# Patient Record
Sex: FEMALE | Race: WHITE | NOT HISPANIC OR LATINO | Employment: OTHER | URBAN - METROPOLITAN AREA
[De-identification: names, ages, dates, MRNs, and addresses within clinical notes are randomized per-mention and may not be internally consistent; named-entity substitution may affect disease eponyms.]

---

## 2023-02-07 PROBLEM — R94.4 DECREASED GFR: Status: ACTIVE | Noted: 2023-02-07

## 2023-02-07 PROBLEM — R29.898 TRANSIENT LEG WEAKNESS: Status: ACTIVE | Noted: 2023-02-07

## 2023-02-07 PROBLEM — E53.8 VITAMIN B12 DEFICIENCY: Status: ACTIVE | Noted: 2023-02-07

## 2023-02-07 PROBLEM — M62.81 MUSCLE WEAKNESS: Status: ACTIVE | Noted: 2023-02-07

## 2023-02-07 PROBLEM — G47.30 SLEEP APNEA: Status: ACTIVE | Noted: 2023-02-07

## 2023-02-07 PROBLEM — G89.29 CHRONIC EPIGASTRIC PAIN: Status: ACTIVE | Noted: 2023-02-07

## 2023-02-07 PROBLEM — R10.13 CHRONIC EPIGASTRIC PAIN: Status: ACTIVE | Noted: 2023-02-07

## 2023-02-07 PROBLEM — M19.90 ARTHRITIS: Status: ACTIVE | Noted: 2023-02-07

## 2023-02-07 PROBLEM — R10.9 ABDOMINAL PAIN: Status: ACTIVE | Noted: 2023-02-07

## 2023-02-07 PROBLEM — J45.909 ASTHMA (HHS-HCC): Status: ACTIVE | Noted: 2023-02-07

## 2023-02-07 PROBLEM — M47.27 LUMBOSACRAL RADICULOPATHY DUE TO DEGENERATIVE JOINT DISEASE OF SPINE: Status: ACTIVE | Noted: 2023-02-07

## 2023-02-07 PROBLEM — R51.9 HEADACHE, CHRONIC DAILY: Status: ACTIVE | Noted: 2023-02-07

## 2023-02-07 PROBLEM — E55.9 MILD VITAMIN D DEFICIENCY: Status: ACTIVE | Noted: 2023-02-07

## 2023-02-07 PROBLEM — L30.4 INTERTRIGO: Status: ACTIVE | Noted: 2023-02-07

## 2023-02-07 PROBLEM — M25.569 KNEE PAIN: Status: ACTIVE | Noted: 2023-02-07

## 2023-02-07 PROBLEM — D64.9 ANEMIA: Status: ACTIVE | Noted: 2023-02-07

## 2023-02-07 PROBLEM — R74.8 ELEVATED LIVER ENZYMES: Status: ACTIVE | Noted: 2023-02-07

## 2023-02-07 PROBLEM — R03.0 ELEVATED BLOOD PRESSURE READING: Status: ACTIVE | Noted: 2023-02-07

## 2023-02-07 PROBLEM — F51.02 ADJUSTMENT INSOMNIA: Status: ACTIVE | Noted: 2023-02-07

## 2023-02-07 PROBLEM — E78.5 MILD HYPERLIPIDEMIA: Status: ACTIVE | Noted: 2023-02-07

## 2023-02-07 PROBLEM — R00.1 BRADYCARDIA: Status: ACTIVE | Noted: 2023-02-07

## 2023-02-07 PROBLEM — F32.A DEPRESSION: Status: ACTIVE | Noted: 2023-02-07

## 2023-02-07 RX ORDER — HYDROCODONE BITARTRATE AND ACETAMINOPHEN 5; 325 MG/1; MG/1
TABLET ORAL
COMMUNITY
Start: 2018-10-10 | End: 2023-03-20 | Stop reason: ALTCHOICE

## 2023-02-07 RX ORDER — OMEPRAZOLE 40 MG/1
40 CAPSULE, DELAYED RELEASE ORAL DAILY
COMMUNITY
Start: 2018-10-29 | End: 2023-09-28

## 2023-02-07 RX ORDER — ASPIRIN 325 MG
50000 TABLET, DELAYED RELEASE (ENTERIC COATED) ORAL
COMMUNITY
Start: 2018-02-28 | End: 2023-03-20 | Stop reason: SDUPTHER

## 2023-02-07 RX ORDER — MONTELUKAST SODIUM 10 MG/1
10 TABLET ORAL NIGHTLY
COMMUNITY
Start: 2018-08-02

## 2023-02-07 RX ORDER — SUCRALFATE 1 G/1
1 TABLET ORAL 4 TIMES DAILY
COMMUNITY
End: 2023-03-20 | Stop reason: ALTCHOICE

## 2023-02-07 RX ORDER — NYSTATIN 100000 [USP'U]/G
POWDER TOPICAL 2 TIMES DAILY
COMMUNITY
Start: 2020-09-09 | End: 2024-03-22 | Stop reason: SDUPTHER

## 2023-02-07 RX ORDER — ALBUTEROL SULFATE 90 UG/1
2 AEROSOL, METERED RESPIRATORY (INHALATION) EVERY 6 HOURS PRN
COMMUNITY
Start: 2021-03-23

## 2023-02-07 RX ORDER — ESCITALOPRAM OXALATE 5 MG/1
5 TABLET ORAL DAILY
COMMUNITY
Start: 2022-09-02 | End: 2023-11-17 | Stop reason: SDUPTHER

## 2023-02-07 RX ORDER — TRAZODONE HYDROCHLORIDE 150 MG/1
150 TABLET ORAL NIGHTLY
COMMUNITY
Start: 2018-08-02 | End: 2024-03-22 | Stop reason: SDUPTHER

## 2023-02-07 RX ORDER — LANOLIN ALCOHOL/MO/W.PET/CERES
1 CREAM (GRAM) TOPICAL DAILY
COMMUNITY
Start: 2018-02-28 | End: 2023-03-20 | Stop reason: ALTCHOICE

## 2023-03-20 ENCOUNTER — OFFICE VISIT (OUTPATIENT)
Dept: PRIMARY CARE | Facility: CLINIC | Age: 78
End: 2023-03-20
Payer: MEDICARE

## 2023-03-20 VITALS
WEIGHT: 282 LBS | OXYGEN SATURATION: 94 % | DIASTOLIC BLOOD PRESSURE: 70 MMHG | HEIGHT: 68 IN | SYSTOLIC BLOOD PRESSURE: 104 MMHG | HEART RATE: 56 BPM | BODY MASS INDEX: 42.74 KG/M2

## 2023-03-20 DIAGNOSIS — E66.01 CLASS 3 SEVERE OBESITY DUE TO EXCESS CALORIES WITH SERIOUS COMORBIDITY AND BODY MASS INDEX (BMI) OF 40.0 TO 44.9 IN ADULT (MULTI): ICD-10-CM

## 2023-03-20 DIAGNOSIS — E53.8 VITAMIN B12 DEFICIENCY: ICD-10-CM

## 2023-03-20 DIAGNOSIS — N18.31 STAGE 3A CHRONIC KIDNEY DISEASE (MULTI): ICD-10-CM

## 2023-03-20 DIAGNOSIS — F33.9 DEPRESSION, RECURRENT (CMS-HCC): ICD-10-CM

## 2023-03-20 DIAGNOSIS — E78.5 MILD HYPERLIPIDEMIA: ICD-10-CM

## 2023-03-20 DIAGNOSIS — M15.9 PRIMARY OSTEOARTHRITIS INVOLVING MULTIPLE JOINTS: ICD-10-CM

## 2023-03-20 DIAGNOSIS — Z00.00 MEDICARE ANNUAL WELLNESS VISIT, SUBSEQUENT: Primary | ICD-10-CM

## 2023-03-20 DIAGNOSIS — R51.9 HEADACHE, CHRONIC DAILY: ICD-10-CM

## 2023-03-20 DIAGNOSIS — E11.41 DIABETIC MONONEUROPATHY ASSOCIATED WITH TYPE 2 DIABETES MELLITUS (MULTI): ICD-10-CM

## 2023-03-20 DIAGNOSIS — R73.09 ELEVATED GLUCOSE: ICD-10-CM

## 2023-03-20 DIAGNOSIS — L84 CORNS AND CALLUS: ICD-10-CM

## 2023-03-20 PROBLEM — M62.81 MUSCLE WEAKNESS: Status: RESOLVED | Noted: 2023-02-07 | Resolved: 2023-03-20

## 2023-03-20 PROBLEM — K21.9 GERD (GASTROESOPHAGEAL REFLUX DISEASE): Status: ACTIVE | Noted: 2021-05-21

## 2023-03-20 PROBLEM — Z98.84 S/P GASTRIC BYPASS: Status: ACTIVE | Noted: 2021-05-21

## 2023-03-20 PROBLEM — R10.9 ABDOMINAL PAIN: Status: RESOLVED | Noted: 2023-02-07 | Resolved: 2023-03-20

## 2023-03-20 PROBLEM — G47.33 OSA (OBSTRUCTIVE SLEEP APNEA): Status: ACTIVE | Noted: 2018-04-18

## 2023-03-20 PROBLEM — M19.90 OSTEOARTHRITIS: Status: ACTIVE | Noted: 2018-04-18

## 2023-03-20 PROBLEM — D64.9 ANEMIA: Status: RESOLVED | Noted: 2023-02-07 | Resolved: 2023-03-20

## 2023-03-20 PROBLEM — M20.40 HAMMERTOE: Status: ACTIVE | Noted: 2023-03-20

## 2023-03-20 PROBLEM — F41.9 ANXIETY: Status: ACTIVE | Noted: 2021-05-21

## 2023-03-20 PROBLEM — E66.813 CLASS 3 SEVERE OBESITY DUE TO EXCESS CALORIES WITH SERIOUS COMORBIDITY AND BODY MASS INDEX (BMI) OF 40.0 TO 44.9 IN ADULT: Status: ACTIVE | Noted: 2023-03-20

## 2023-03-20 PROCEDURE — 99214 OFFICE O/P EST MOD 30 MIN: CPT | Performed by: NURSE PRACTITIONER

## 2023-03-20 PROCEDURE — 1159F MED LIST DOCD IN RCRD: CPT | Performed by: NURSE PRACTITIONER

## 2023-03-20 PROCEDURE — 99497 ADVNCD CARE PLAN 30 MIN: CPT | Performed by: NURSE PRACTITIONER

## 2023-03-20 PROCEDURE — 1160F RVW MEDS BY RX/DR IN RCRD: CPT | Performed by: NURSE PRACTITIONER

## 2023-03-20 PROCEDURE — 3074F SYST BP LT 130 MM HG: CPT | Performed by: NURSE PRACTITIONER

## 2023-03-20 PROCEDURE — 1036F TOBACCO NON-USER: CPT | Performed by: NURSE PRACTITIONER

## 2023-03-20 PROCEDURE — 1158F ADVNC CARE PLAN TLK DOCD: CPT | Performed by: NURSE PRACTITIONER

## 2023-03-20 PROCEDURE — 96372 THER/PROPH/DIAG INJ SC/IM: CPT | Performed by: NURSE PRACTITIONER

## 2023-03-20 PROCEDURE — 3078F DIAST BP <80 MM HG: CPT | Performed by: NURSE PRACTITIONER

## 2023-03-20 PROCEDURE — G0439 PPPS, SUBSEQ VISIT: HCPCS | Performed by: NURSE PRACTITIONER

## 2023-03-20 PROCEDURE — 1170F FXNL STATUS ASSESSED: CPT | Performed by: NURSE PRACTITIONER

## 2023-03-20 RX ORDER — CYANOCOBALAMIN 1000 UG/ML
1000 INJECTION, SOLUTION INTRAMUSCULAR; SUBCUTANEOUS
COMMUNITY

## 2023-03-20 RX ORDER — DEXTROMETHORPHAN HYDROBROMIDE, GUAIFENESIN 5; 100 MG/5ML; MG/5ML
650 LIQUID ORAL EVERY 8 HOURS PRN
COMMUNITY

## 2023-03-20 RX ORDER — TOPIRAMATE 25 MG/1
50 TABLET ORAL NIGHTLY
COMMUNITY
Start: 2021-09-21 | End: 2023-06-02 | Stop reason: SDUPTHER

## 2023-03-20 RX ORDER — CYANOCOBALAMIN 1000 UG/ML
1000 INJECTION, SOLUTION INTRAMUSCULAR; SUBCUTANEOUS ONCE
Status: COMPLETED | OUTPATIENT
Start: 2023-03-20 | End: 2023-03-20

## 2023-03-20 RX ORDER — ERGOCALCIFEROL 1.25 MG/1
50000 CAPSULE ORAL
COMMUNITY
Start: 2018-02-28 | End: 2023-10-09 | Stop reason: SDUPTHER

## 2023-03-20 RX ADMIN — CYANOCOBALAMIN 1000 MCG: 1000 INJECTION, SOLUTION INTRAMUSCULAR; SUBCUTANEOUS at 11:36

## 2023-03-20 ASSESSMENT — ENCOUNTER SYMPTOMS
CONFUSION: 0
BLOOD IN STOOL: 0
SHORTNESS OF BREATH: 0
LIGHT-HEADEDNESS: 0
VOMITING: 0
DIZZINESS: 0
SLEEP DISTURBANCE: 0
NAUSEA: 0
PALPITATIONS: 0
NERVOUS/ANXIOUS: 0
LOSS OF SENSATION IN FEET: 1
HEADACHES: 0
DIARRHEA: 0
DYSURIA: 0
CONSTIPATION: 0
COLOR CHANGE: 0
ARTHRALGIAS: 1
FATIGUE: 0
DEPRESSION: 1
DYSPHORIC MOOD: 0
CHEST TIGHTNESS: 0
MYALGIAS: 0
ABDOMINAL PAIN: 0
OCCASIONAL FEELINGS OF UNSTEADINESS: 1

## 2023-03-20 ASSESSMENT — PATIENT HEALTH QUESTIONNAIRE - PHQ9
8. MOVING OR SPEAKING SO SLOWLY THAT OTHER PEOPLE COULD HAVE NOTICED. OR THE OPPOSITE, BEING SO FIGETY OR RESTLESS THAT YOU HAVE BEEN MOVING AROUND A LOT MORE THAN USUAL: NOT AT ALL
SUM OF ALL RESPONSES TO PHQ9 QUESTIONS 1 AND 2: 4
3. TROUBLE FALLING OR STAYING ASLEEP OR SLEEPING TOO MUCH: NOT AT ALL
5. POOR APPETITE OR OVEREATING: NOT AT ALL
7. TROUBLE CONCENTRATING ON THINGS, SUCH AS READING THE NEWSPAPER OR WATCHING TELEVISION: NOT AT ALL
2. FEELING DOWN, DEPRESSED OR HOPELESS: MORE THAN HALF THE DAYS
SUM OF ALL RESPONSES TO PHQ QUESTIONS 1-9: 10
4. FEELING TIRED OR HAVING LITTLE ENERGY: NEARLY EVERY DAY
10. IF YOU CHECKED OFF ANY PROBLEMS, HOW DIFFICULT HAVE THESE PROBLEMS MADE IT FOR YOU TO DO YOUR WORK, TAKE CARE OF THINGS AT HOME, OR GET ALONG WITH OTHER PEOPLE: NOT DIFFICULT AT ALL
1. LITTLE INTEREST OR PLEASURE IN DOING THINGS: MORE THAN HALF THE DAYS
6. FEELING BAD ABOUT YOURSELF - OR THAT YOU ARE A FAILURE OR HAVE LET YOURSELF OR YOUR FAMILY DOWN: NEARLY EVERY DAY
9. THOUGHTS THAT YOU WOULD BE BETTER OFF DEAD, OR OF HURTING YOURSELF: NOT AT ALL

## 2023-03-20 ASSESSMENT — ACTIVITIES OF DAILY LIVING (ADL)
MANAGING_FINANCES: INDEPENDENT
BATHING: INDEPENDENT
DRESSING: INDEPENDENT
TAKING_MEDICATION: INDEPENDENT
GROCERY_SHOPPING: INDEPENDENT
DOING_HOUSEWORK: INDEPENDENT

## 2023-03-20 NOTE — PATIENT INSTRUCTIONS
BMI was above normal measurement. Current weight: 128 kg (282 lb)  Weight change since last visit (-) denotes wt loss -2.13 lbs   Weight loss needed to achieve BMI 25: 117.9 Lbs  Weight loss needed to achieve BMI 30: 85.1 Lbs  Provided instructions on dietary changes  Advised to Increase physical activity

## 2023-03-20 NOTE — ACP (ADVANCE CARE PLANNING)
Advance Care Planning Note     Discussion Date: 03/20/23   Discussion Participants: patient    The patient wishes to discuss Advance Care Planning today and the following is a brief summary of our discussion.     Patient has capacity to make their own medical decisions: Yes  Health Care Agent/Surrogate Decision Maker documented in chart: Yes    Documents on file and valid:  Advance Directive/Living Will:  has but not on file    Health Care Power of :  has but not on file  Other:       Communication of Medical Status/Prognosis:   good     Communication of Treatment Goals/Options:   Reviewed with patient.        Treatment Decisions  She does not want to be left on a ventilator.     Time Statement: Total face to face time spent on advance care planning was 5 minutes with 5 minutes spent in counseling, including the explanation.    NAYANA Lopez-CNP  3/20/2023 11:18 AM

## 2023-03-20 NOTE — PROGRESS NOTES
"Subjective   Patient ID: Gracia Mercer is a 77 y.o. female who presents for [facility]ness Exam; routine 3 month check up; monthly B12 injection; discuss Ozempic.    HPI        Review of Systems      Objective    Vitals:    03/20/23 1043   BP: 104/70   BP Location: Right arm   Pulse: 56   SpO2: 94%   Weight: 128 kg (282 lb)   Height: 1.727 m (5' 8\")        Physical Exam       Assessment/Plan          "

## 2023-03-20 NOTE — PROGRESS NOTES
"Subjective   Reason for Visit: Gracia Mercer is an 77 y.o. female here for a Medicare Wellness visit.     Past Medical, Surgical, and Family History reviewed and updated in chart.    Reviewed all medications by prescribing practitioner or clinical pharmacist (such as prescriptions, OTCs, herbal therapies and supplements) and documented in the medical record.    HPI  Gracia returns for her medicare wellness and chronic care follow up. She is asking regarding trying Ozempic. However, she has had a recent diagnosis of pancreatitis within the past couple years so this makes her ineligible to receive it.     Evaluation for diabetic shoes: she has chronic calluses/corns/ and hammertoe on her left foot. She also has some mild neuropathy in both her feet per podiatry.     She is also due for her B12 injection which she will get today.     Patient Care Team:  NAYANA Lopez-CNP as PCP - General (Family Medicine)  David Adam DPM as Surgeon (Podiatry)  Rigoberto Loco MD as Consulting Physician (Orthopaedic Surgery)     Review of Systems   Constitutional:  Negative for fatigue.   Respiratory:  Negative for chest tightness and shortness of breath.    Cardiovascular:  Negative for chest pain, palpitations and leg swelling.   Gastrointestinal:  Negative for abdominal pain, blood in stool, constipation, diarrhea, nausea and vomiting.   Genitourinary:  Negative for dysuria.   Musculoskeletal:  Positive for arthralgias. Negative for myalgias.   Skin:  Negative for color change.   Neurological:  Negative for dizziness, light-headedness and headaches.   Psychiatric/Behavioral:  Negative for confusion, dysphoric mood, sleep disturbance and suicidal ideas. The patient is not nervous/anxious.        Objective   Vitals:  /70 (BP Location: Right arm)   Pulse 56   Ht 1.727 m (5' 8\")   Wt 128 kg (282 lb)   SpO2 94%   BMI 42.88 kg/m²       Physical Exam  Vitals and nursing note reviewed.   Constitutional:       " Appearance: Normal appearance.   HENT:      Head: Normocephalic and atraumatic.   Cardiovascular:      Rate and Rhythm: Normal rate and regular rhythm.      Heart sounds: Normal heart sounds.   Pulmonary:      Effort: Pulmonary effort is normal.      Breath sounds: Normal breath sounds.   Abdominal:      General: Bowel sounds are normal.   Skin:     General: Skin is warm and dry.   Neurological:      Mental Status: She is alert and oriented to person, place, and time.   Psychiatric:         Mood and Affect: Mood normal.         Behavior: Behavior normal.         Thought Content: Thought content normal.         Judgment: Judgment normal.         Assessment/Plan   Problem List Items Addressed This Visit          Nervous    Headache, chronic daily    Current Assessment & Plan     Topamax 50 mg nightly         Relevant Medications    topiramate (Topamax) 25 mg tablet    Diabetic mononeuropathy associated with type 2 diabetes mellitus (CMS/HCC)    Current Assessment & Plan     Sees podiatry-DR. iniguez            Genitourinary    Stage 3a chronic kidney disease    Relevant Orders    Comprehensive Metabolic Panel       Musculoskeletal    Osteoarthritis    Relevant Medications    acetaminophen (Tylenol 8 Hour) 650 mg ER tablet       Endocrine/Metabolic    Vitamin B12 deficiency    Current Assessment & Plan     B12 1000 mcg injections monthly         Relevant Medications    cyanocobalamin (Vitamin B-12) 1,000 mcg/mL injection    cyanocobalamin (Vitamin B-12) injection 1,000 mcg (Completed)    Class 3 severe obesity due to excess calories with serious comorbidity and body mass index (BMI) of 40.0 to 44.9 in adult (CMS/HCC)       Other    Depression, recurrent (CMS/HCC)    Relevant Medications    ergocalciferol (Vitamin D-2) 1.25 MG (68013 UT) capsule    Mild hyperlipidemia    Relevant Orders    Lipid Panel    Comprehensive Metabolic Panel    Corns and callus    Current Assessment & Plan     Sees David Berg for  podiatry          Other Visit Diagnoses       Medicare annual wellness visit, subsequent    -  Primary    Elevated glucose        Relevant Orders    Hemoglobin A1C             Follow up in 6 months. She is to get lab work, in 2 months. We will call her with results.

## 2023-04-25 ENCOUNTER — CLINICAL SUPPORT (OUTPATIENT)
Dept: PRIMARY CARE | Facility: CLINIC | Age: 78
End: 2023-04-25
Payer: MEDICARE

## 2023-04-25 ENCOUNTER — TELEPHONE (OUTPATIENT)
Dept: PRIMARY CARE | Facility: CLINIC | Age: 78
End: 2023-04-25

## 2023-04-25 DIAGNOSIS — E53.8 VITAMIN B12 DEFICIENCY: ICD-10-CM

## 2023-04-25 PROCEDURE — 96372 THER/PROPH/DIAG INJ SC/IM: CPT | Performed by: NURSE PRACTITIONER

## 2023-04-25 RX ORDER — CYANOCOBALAMIN 1000 UG/ML
1000 INJECTION, SOLUTION INTRAMUSCULAR; SUBCUTANEOUS ONCE
Status: COMPLETED | OUTPATIENT
Start: 2023-04-25 | End: 2023-04-25

## 2023-04-25 RX ADMIN — CYANOCOBALAMIN 1000 MCG: 1000 INJECTION, SOLUTION INTRAMUSCULAR; SUBCUTANEOUS at 13:17

## 2023-04-25 NOTE — TELEPHONE ENCOUNTER
She was in today for a B12 and she said you had talked to her about about Rybelsus. She said she would like to try it.

## 2023-04-27 ENCOUNTER — LAB (OUTPATIENT)
Dept: LAB | Facility: LAB | Age: 78
End: 2023-04-27
Payer: MEDICARE

## 2023-04-27 DIAGNOSIS — N18.31 STAGE 3A CHRONIC KIDNEY DISEASE (MULTI): ICD-10-CM

## 2023-04-27 DIAGNOSIS — E78.5 MILD HYPERLIPIDEMIA: ICD-10-CM

## 2023-04-27 DIAGNOSIS — R73.09 ELEVATED GLUCOSE: ICD-10-CM

## 2023-04-27 LAB
ALANINE AMINOTRANSFERASE (SGPT) (U/L) IN SER/PLAS: 9 U/L (ref 7–45)
ALBUMIN (G/DL) IN SER/PLAS: 3.6 G/DL (ref 3.4–5)
ALKALINE PHOSPHATASE (U/L) IN SER/PLAS: 58 U/L (ref 33–136)
ANION GAP IN SER/PLAS: 13 MMOL/L (ref 10–20)
ASPARTATE AMINOTRANSFERASE (SGOT) (U/L) IN SER/PLAS: 15 U/L (ref 9–39)
BILIRUBIN TOTAL (MG/DL) IN SER/PLAS: 0.8 MG/DL (ref 0–1.2)
CALCIUM (MG/DL) IN SER/PLAS: 8.8 MG/DL (ref 8.6–10.3)
CARBON DIOXIDE, TOTAL (MMOL/L) IN SER/PLAS: 29 MMOL/L (ref 21–32)
CHLORIDE (MMOL/L) IN SER/PLAS: 103 MMOL/L (ref 98–107)
CHOLESTEROL (MG/DL) IN SER/PLAS: 143 MG/DL (ref 0–199)
CHOLESTEROL IN HDL (MG/DL) IN SER/PLAS: 55 MG/DL
CHOLESTEROL/HDL RATIO: 2.6
CREATININE (MG/DL) IN SER/PLAS: 1.07 MG/DL (ref 0.5–1.05)
ESTIMATED AVERAGE GLUCOSE FOR HBA1C: 108 MG/DL
GFR FEMALE: 54 ML/MIN/1.73M2
GLUCOSE (MG/DL) IN SER/PLAS: 77 MG/DL (ref 74–99)
HEMOGLOBIN A1C/HEMOGLOBIN TOTAL IN BLOOD: 5.4 %
LDL: 78 MG/DL (ref 0–99)
POTASSIUM (MMOL/L) IN SER/PLAS: 4.5 MMOL/L (ref 3.5–5.3)
PROTEIN TOTAL: 6.4 G/DL (ref 6.4–8.2)
SODIUM (MMOL/L) IN SER/PLAS: 140 MMOL/L (ref 136–145)
TRIGLYCERIDE (MG/DL) IN SER/PLAS: 50 MG/DL (ref 0–149)
UREA NITROGEN (MG/DL) IN SER/PLAS: 19 MG/DL (ref 6–23)
VLDL: 10 MG/DL (ref 0–40)

## 2023-04-27 PROCEDURE — 83036 HEMOGLOBIN GLYCOSYLATED A1C: CPT

## 2023-04-27 PROCEDURE — 36415 COLL VENOUS BLD VENIPUNCTURE: CPT

## 2023-04-27 PROCEDURE — 80061 LIPID PANEL: CPT

## 2023-04-27 PROCEDURE — 80053 COMPREHEN METABOLIC PANEL: CPT

## 2023-04-28 ENCOUNTER — TELEPHONE (OUTPATIENT)
Dept: PRIMARY CARE | Facility: CLINIC | Age: 78
End: 2023-04-28
Payer: COMMERCIAL

## 2023-04-28 NOTE — TELEPHONE ENCOUNTER
Pt notified of the following results.  She is inquiring if there is anything she can do to improve kidney function.

## 2023-04-28 NOTE — TELEPHONE ENCOUNTER
----- Message from Zeinab Chopra, APRN-CNP sent at 4/28/2023 12:18 PM EDT -----  Let her know her lab work showed her A1c is 5.4 which is in the normal range. So she is no longer diabetic or prediabetic. Her cholesterol panel is about the same as it was last year. All within normal range. Her electrolyte panel/liver panel was fine. Her kidney function is a little worse. We will continue to monitor this every 6 months.

## 2023-06-02 ENCOUNTER — OFFICE VISIT (OUTPATIENT)
Dept: PRIMARY CARE | Facility: CLINIC | Age: 78
End: 2023-06-02
Payer: MEDICARE

## 2023-06-02 VITALS
WEIGHT: 286 LBS | BODY MASS INDEX: 44.89 KG/M2 | HEART RATE: 63 BPM | DIASTOLIC BLOOD PRESSURE: 66 MMHG | SYSTOLIC BLOOD PRESSURE: 116 MMHG | HEIGHT: 67 IN | OXYGEN SATURATION: 94 %

## 2023-06-02 DIAGNOSIS — E11.41 DIABETIC MONONEUROPATHY ASSOCIATED WITH TYPE 2 DIABETES MELLITUS (MULTI): Primary | Chronic | ICD-10-CM

## 2023-06-02 DIAGNOSIS — R51.9 HEADACHE, CHRONIC DAILY: ICD-10-CM

## 2023-06-02 DIAGNOSIS — E53.8 VITAMIN B12 DEFICIENCY: ICD-10-CM

## 2023-06-02 DIAGNOSIS — E66.01 CLASS 3 SEVERE OBESITY DUE TO EXCESS CALORIES WITH SERIOUS COMORBIDITY AND BODY MASS INDEX (BMI) OF 40.0 TO 44.9 IN ADULT (MULTI): ICD-10-CM

## 2023-06-02 PROBLEM — M25.569 KNEE PAIN: Status: RESOLVED | Noted: 2023-02-07 | Resolved: 2023-06-02

## 2023-06-02 PROCEDURE — 1036F TOBACCO NON-USER: CPT | Performed by: NURSE PRACTITIONER

## 2023-06-02 PROCEDURE — 1160F RVW MEDS BY RX/DR IN RCRD: CPT | Performed by: NURSE PRACTITIONER

## 2023-06-02 PROCEDURE — 99214 OFFICE O/P EST MOD 30 MIN: CPT | Performed by: NURSE PRACTITIONER

## 2023-06-02 PROCEDURE — 1159F MED LIST DOCD IN RCRD: CPT | Performed by: NURSE PRACTITIONER

## 2023-06-02 PROCEDURE — 96372 THER/PROPH/DIAG INJ SC/IM: CPT | Performed by: NURSE PRACTITIONER

## 2023-06-02 RX ORDER — CYANOCOBALAMIN 1000 UG/ML
1000 INJECTION, SOLUTION INTRAMUSCULAR; SUBCUTANEOUS ONCE
Status: COMPLETED | OUTPATIENT
Start: 2023-06-02 | End: 2023-06-02

## 2023-06-02 RX ORDER — TOPIRAMATE 25 MG/1
50 TABLET ORAL NIGHTLY
Qty: 180 TABLET | Refills: 1 | Status: SHIPPED | OUTPATIENT
Start: 2023-06-02 | End: 2024-04-22

## 2023-06-02 RX ADMIN — CYANOCOBALAMIN 1000 MCG: 1000 INJECTION, SOLUTION INTRAMUSCULAR; SUBCUTANEOUS at 11:45

## 2023-06-02 ASSESSMENT — PATIENT HEALTH QUESTIONNAIRE - PHQ9
SUM OF ALL RESPONSES TO PHQ9 QUESTIONS 1 AND 2: 0
1. LITTLE INTEREST OR PLEASURE IN DOING THINGS: NOT AT ALL
2. FEELING DOWN, DEPRESSED OR HOPELESS: NOT AT ALL

## 2023-06-02 NOTE — PROGRESS NOTES
Subjective   Patient ID: Gracia Mercer is a 78 y.o. female who presents to discuss weight loss, B12, exertional SOB, and arthritis      HPI  Gracia returns with concerns regarding to questions to medications, arthritic pain, numbness/discoloration to bilateral hands, and weight gain.     Use of Topamax: she is currently being Rx Topamax for Chronic headaches, however she reports she doesn't take this medicine as prescribed. She reports she is still getting headaches several times a week.     Weight gain: She reports steady weight gain since having her bariatric surgery in 1996-7.  She is asking if she can start injectables for weight loss. She has not been active. She reports she gets up in the morning, eats breakfast, and usually goes back to her room after. Just started walking once or twice with her daughter since her daughter started getting active and is taking weight loss medication and has lost 45# over the last couple months. Gracia's A1c has been in the normal range since having bariatric surgery as she was classified as diabetic prior to surgery but has not taken medication since her surgery.     Type 2 DM: Controlled by diet. A1c was 5.4% .    Vitamin B12 def: discussed why she needs to be on b12 replacement.    Arthritis pain: complaints of joint pain with movement. Has not been active.     Increase in shortness of breath/fatigue: discussed that sine she has not been active she has deconditioned and she needs to be more active to build her tolerance back up. Discussed that to manage arthritic pain it is important to keep moving.     Numbness/discoloration to hands: has been diagnosed with Raynauds in the past. Denies constant numbness. Reports it is intermittent. Discussed ways to manage symptoms and to protect hands especially in winter/cold months.     Review of Systems   Constitutional:  Positive for unexpected weight change (weight gain). Negative for fatigue.   Respiratory:  Positive for  "shortness of breath. Negative for chest tightness.    Cardiovascular:  Negative for chest pain, palpitations and leg swelling.   Gastrointestinal:  Negative for abdominal pain, blood in stool, constipation, diarrhea, nausea and vomiting.   Genitourinary:  Negative for dysuria.   Musculoskeletal:  Positive for arthralgias. Negative for myalgias.   Skin:  Positive for color change (hands bilaterally intemittently).   Neurological:  Negative for dizziness, light-headedness and headaches.       Objective   Vitals:    06/02/23 1028   BP: 116/66   BP Location: Left arm   Pulse: 63   SpO2: 94%   Weight: 130 kg (286 lb)   Height: 1.702 m (5' 7\")      Physical Exam  Vitals and nursing note reviewed.   Constitutional:       Appearance: Normal appearance.   Cardiovascular:      Rate and Rhythm: Normal rate and regular rhythm.      Heart sounds: Normal heart sounds.   Pulmonary:      Effort: Pulmonary effort is normal.      Breath sounds: Normal breath sounds.   Neurological:      Mental Status: She is alert and oriented to person, place, and time.   Psychiatric:         Mood and Affect: Mood normal.         Behavior: Behavior normal.         Thought Content: Thought content normal.         Judgment: Judgment normal.         Assessment/Plan   Diagnoses and all orders for this visit:  Diabetic mononeuropathy associated with type 2 diabetes mellitus (CMS/[facility])  -     semaglutide 0.25 mg or 0.5 mg (2 mg/3 mL) pen injector; Inject 0.25 mg under the skin 1 (one) time per week.  Headache, chronic daily  -     topiramate (Topamax) 25 mg tablet; Take 2 tablets (50 mg) by mouth once daily at bedtime.  Vitamin B12 deficiency  -     cyanocobalamin (Vitamin B-12) injection 1,000 mcg  Class 3 severe obesity due to excess calories with serious comorbidity and body mass index (BMI) of 40.0 to 44.9 in adult (CMS/[facility])  -     semaglutide 0.25 mg or 0.5 mg (2 mg/3 mL) pen injector; Inject 0.25 mg under the skin 1 (one) time per week.      Problem " List Items Addressed This Visit       Vitamin B12 deficiency    Headache, chronic daily    Relevant Medications    topiramate (Topamax) 25 mg tablet    Class 3 severe obesity due to excess calories with serious comorbidity and body mass index (BMI) of 40.0 to 44.9 in adult (CMS/[facility])    Relevant Medications    semaglutide 0.25 mg or 0.5 mg (2 mg/3 mL) pen injector    Diabetic mononeuropathy associated with type 2 diabetes mellitus (CMS/[facility]) - Primary     Start Ozempic 0.25 mg x 4 weeks, and then increase to 0.5 mg x 4 weeks. Monitor Blood sugars.          Relevant Medications    semaglutide 0.25 mg or 0.5 mg (2 mg/3 mL) pen injector     Follow up in 3 months, September.

## 2023-06-04 ASSESSMENT — ENCOUNTER SYMPTOMS
ABDOMINAL PAIN: 0
DYSURIA: 0
NAUSEA: 0
FATIGUE: 0
HEADACHES: 0
SHORTNESS OF BREATH: 1
CONSTIPATION: 0
MYALGIAS: 0
VOMITING: 0
LIGHT-HEADEDNESS: 0
PALPITATIONS: 0
ARTHRALGIAS: 1
DIARRHEA: 0
COLOR CHANGE: 1
CHEST TIGHTNESS: 0
BLOOD IN STOOL: 0
DIZZINESS: 0
UNEXPECTED WEIGHT CHANGE: 1

## 2023-06-22 ENCOUNTER — CLINICAL SUPPORT (OUTPATIENT)
Dept: PRIMARY CARE | Facility: CLINIC | Age: 78
End: 2023-06-22
Payer: MEDICARE

## 2023-06-22 DIAGNOSIS — E53.8 VITAMIN B12 DEFICIENCY: ICD-10-CM

## 2023-06-22 PROCEDURE — 96372 THER/PROPH/DIAG INJ SC/IM: CPT | Performed by: NURSE PRACTITIONER

## 2023-06-22 RX ORDER — CYANOCOBALAMIN 1000 UG/ML
1000 INJECTION, SOLUTION INTRAMUSCULAR; SUBCUTANEOUS ONCE
Status: COMPLETED | OUTPATIENT
Start: 2023-06-22 | End: 2023-06-22

## 2023-06-22 RX ADMIN — CYANOCOBALAMIN 1000 MCG: 1000 INJECTION, SOLUTION INTRAMUSCULAR; SUBCUTANEOUS at 11:23

## 2023-06-23 ENCOUNTER — APPOINTMENT (OUTPATIENT)
Dept: PRIMARY CARE | Facility: CLINIC | Age: 78
End: 2023-06-23
Payer: MEDICARE

## 2023-07-13 DIAGNOSIS — E11.41 DIABETIC MONONEUROPATHY ASSOCIATED WITH TYPE 2 DIABETES MELLITUS (MULTI): Chronic | ICD-10-CM

## 2023-07-13 DIAGNOSIS — E66.01 CLASS 3 SEVERE OBESITY DUE TO EXCESS CALORIES WITH SERIOUS COMORBIDITY AND BODY MASS INDEX (BMI) OF 40.0 TO 44.9 IN ADULT (MULTI): ICD-10-CM

## 2023-07-13 RX ORDER — INSULIN PUMP SYRINGE, 3 ML
1 EACH MISCELLANEOUS DAILY
COMMUNITY
End: 2023-07-13 | Stop reason: SDUPTHER

## 2023-07-13 RX ORDER — LANCETS
1 EACH MISCELLANEOUS DAILY
COMMUNITY
End: 2023-07-13 | Stop reason: SDUPTHER

## 2023-07-13 RX ORDER — LANCETS
1 EACH MISCELLANEOUS DAILY
Qty: 100 EACH | Refills: 0 | Status: SHIPPED | OUTPATIENT
Start: 2023-07-13

## 2023-07-13 RX ORDER — INSULIN PUMP SYRINGE, 3 ML
1 EACH MISCELLANEOUS DAILY
Qty: 1 EACH | Refills: 0 | Status: SHIPPED | OUTPATIENT
Start: 2023-07-13

## 2023-07-13 RX ORDER — IBUPROFEN 200 MG
1 CAPSULE ORAL DAILY
Qty: 100 STRIP | Refills: 0 | Status: SHIPPED | OUTPATIENT
Start: 2023-07-13

## 2023-07-13 RX ORDER — IBUPROFEN 200 MG
1 CAPSULE ORAL DAILY
COMMUNITY
End: 2023-07-13 | Stop reason: SDUPTHER

## 2023-07-20 ENCOUNTER — CLINICAL SUPPORT (OUTPATIENT)
Dept: PRIMARY CARE | Facility: CLINIC | Age: 78
End: 2023-07-20
Payer: MEDICARE

## 2023-07-20 DIAGNOSIS — E53.8 VITAMIN B12 DEFICIENCY: ICD-10-CM

## 2023-07-20 PROCEDURE — 96372 THER/PROPH/DIAG INJ SC/IM: CPT | Performed by: NURSE PRACTITIONER

## 2023-07-20 RX ORDER — CYANOCOBALAMIN 1000 UG/ML
1000 INJECTION, SOLUTION INTRAMUSCULAR; SUBCUTANEOUS ONCE
Status: COMPLETED | OUTPATIENT
Start: 2023-07-20 | End: 2023-07-20

## 2023-07-20 RX ADMIN — CYANOCOBALAMIN 1000 MCG: 1000 INJECTION, SOLUTION INTRAMUSCULAR; SUBCUTANEOUS at 10:32

## 2023-08-07 ENCOUNTER — OFFICE VISIT (OUTPATIENT)
Dept: PRIMARY CARE | Facility: CLINIC | Age: 78
End: 2023-08-07
Payer: MEDICARE

## 2023-08-07 VITALS
WEIGHT: 276 LBS | BODY MASS INDEX: 43.32 KG/M2 | HEIGHT: 67 IN | HEART RATE: 58 BPM | DIASTOLIC BLOOD PRESSURE: 70 MMHG | SYSTOLIC BLOOD PRESSURE: 128 MMHG

## 2023-08-07 DIAGNOSIS — E53.8 VITAMIN B12 DEFICIENCY: ICD-10-CM

## 2023-08-07 DIAGNOSIS — E66.01 CLASS 3 SEVERE OBESITY DUE TO EXCESS CALORIES WITH SERIOUS COMORBIDITY AND BODY MASS INDEX (BMI) OF 40.0 TO 44.9 IN ADULT (MULTI): ICD-10-CM

## 2023-08-07 DIAGNOSIS — E11.41 DIABETIC MONONEUROPATHY ASSOCIATED WITH TYPE 2 DIABETES MELLITUS (MULTI): Primary | ICD-10-CM

## 2023-08-07 DIAGNOSIS — N18.31 STAGE 3A CHRONIC KIDNEY DISEASE (MULTI): ICD-10-CM

## 2023-08-07 DIAGNOSIS — E55.9 MILD VITAMIN D DEFICIENCY: ICD-10-CM

## 2023-08-07 DIAGNOSIS — E78.5 MILD HYPERLIPIDEMIA: ICD-10-CM

## 2023-08-07 PROCEDURE — 1159F MED LIST DOCD IN RCRD: CPT | Performed by: NURSE PRACTITIONER

## 2023-08-07 PROCEDURE — 99213 OFFICE O/P EST LOW 20 MIN: CPT | Performed by: NURSE PRACTITIONER

## 2023-08-07 PROCEDURE — 1036F TOBACCO NON-USER: CPT | Performed by: NURSE PRACTITIONER

## 2023-08-07 PROCEDURE — 1160F RVW MEDS BY RX/DR IN RCRD: CPT | Performed by: NURSE PRACTITIONER

## 2023-08-07 RX ORDER — LANCETS 33 GAUGE
EACH MISCELLANEOUS
COMMUNITY
Start: 2023-07-13

## 2023-08-07 ASSESSMENT — ENCOUNTER SYMPTOMS
LIGHT-HEADEDNESS: 0
ARTHRALGIAS: 0
HEADACHES: 0
CHEST TIGHTNESS: 0
NAUSEA: 0
FATIGUE: 0
DIZZINESS: 0
DIARRHEA: 0
COLOR CHANGE: 0
BLOOD IN STOOL: 0
VOMITING: 0
MYALGIAS: 0
DYSURIA: 0
PALPITATIONS: 0
ABDOMINAL PAIN: 0
CONSTIPATION: 0
SHORTNESS OF BREATH: 0

## 2023-08-07 NOTE — PROGRESS NOTES
"Subjective   Patient ID: Gracia Mercer is a 78 y.o. female who presents for Ozempic follow up.    HPI   Gracia returns for Ozempic follow up.    Doing well, has not noticed any low blood sugars. Does have some intermittent nausea but no vomiting or constipation/diarrhea issues. She reports she has no concerns. She just started her 0.5 mg weekly injections this past week (Thursday?). She is down 10# since starting last month.         Review of Systems   Constitutional:  Negative for fatigue.   Respiratory:  Negative for chest tightness and shortness of breath.    Cardiovascular:  Negative for chest pain, palpitations and leg swelling.   Gastrointestinal:  Negative for abdominal pain, blood in stool, constipation, diarrhea, nausea and vomiting.   Genitourinary:  Negative for dysuria.   Musculoskeletal:  Negative for arthralgias and myalgias.   Skin:  Negative for color change.   Neurological:  Negative for dizziness, light-headedness and headaches.       Objective   /70   Pulse 58   Ht 1.702 m (5' 7\")   Wt 125 kg (276 lb)   BMI 43.23 kg/m²     Physical Exam  Vitals and nursing note reviewed.   Constitutional:       Appearance: Normal appearance.   HENT:      Head: Normocephalic and atraumatic.   Cardiovascular:      Rate and Rhythm: Normal rate and regular rhythm.      Pulses: Normal pulses.      Heart sounds: Normal heart sounds.   Pulmonary:      Effort: Pulmonary effort is normal.      Breath sounds: Normal breath sounds.   Abdominal:      General: Bowel sounds are normal.      Palpations: Abdomen is soft.   Skin:     General: Skin is warm and dry.   Neurological:      Mental Status: She is alert and oriented to person, place, and time.   Psychiatric:         Mood and Affect: Mood normal.         Behavior: Behavior normal.         Thought Content: Thought content normal.         Judgment: Judgment normal.         Assessment/Plan   Problem List Items Addressed This Visit       Stage 3a chronic kidney " disease (CMS/[facility])    Vitamin B12 deficiency    Relevant Orders    Vitamin B12    Mild vitamin D deficiency    Relevant Orders    Vitamin D, Total    Mild hyperlipidemia    Relevant Orders    Lipid Panel    Class 3 severe obesity due to excess calories with serious comorbidity and body mass index (BMI) of 40.0 to 44.9 in adult (CMS/[facility])     Pt advised to institute a healthy, well-balanced meal with portion control and to exercise daily for at least 30-60 minutes.         Diabetic mononeuropathy associated with type 2 diabetes mellitus (CMS/[facility]) - Primary     Continue ozempic at 0.5 mg weekly and then increase to 1 mg weekly for 4 weeks.   Due for labs in October.          Relevant Orders    Comprehensive Metabolic Panel    Hemoglobin A1C    TSH with reflex to Free T4 if abnormal    CBC and Auto Differential         Follow up in 2-3 months.

## 2023-08-08 NOTE — ASSESSMENT & PLAN NOTE
Continue ozempic at 0.5 mg weekly and then increase to 1 mg weekly for 4 weeks.   Due for labs in October.

## 2023-08-24 ENCOUNTER — CLINICAL SUPPORT (OUTPATIENT)
Dept: PRIMARY CARE | Facility: CLINIC | Age: 78
End: 2023-08-24
Payer: MEDICARE

## 2023-08-24 DIAGNOSIS — E11.41 DIABETIC MONONEUROPATHY ASSOCIATED WITH TYPE 2 DIABETES MELLITUS (MULTI): Chronic | ICD-10-CM

## 2023-08-24 DIAGNOSIS — R03.0 ELEVATED BLOOD PRESSURE READING: Primary | ICD-10-CM

## 2023-08-24 DIAGNOSIS — E66.01 CLASS 3 SEVERE OBESITY DUE TO EXCESS CALORIES WITH SERIOUS COMORBIDITY AND BODY MASS INDEX (BMI) OF 40.0 TO 44.9 IN ADULT (MULTI): ICD-10-CM

## 2023-08-24 DIAGNOSIS — E53.8 VITAMIN B12 DEFICIENCY: Primary | ICD-10-CM

## 2023-08-24 PROCEDURE — 96372 THER/PROPH/DIAG INJ SC/IM: CPT | Performed by: NURSE PRACTITIONER

## 2023-08-24 RX ORDER — CYANOCOBALAMIN 1000 UG/ML
1000 INJECTION, SOLUTION INTRAMUSCULAR; SUBCUTANEOUS ONCE
Status: COMPLETED | OUTPATIENT
Start: 2023-08-24 | End: 2023-08-24

## 2023-08-24 RX ORDER — ACETAMINOPHEN 500 MG
1 TABLET ORAL DAILY
Qty: 1 KIT | Refills: 0 | Status: SHIPPED | OUTPATIENT
Start: 2023-08-24

## 2023-08-24 RX ADMIN — CYANOCOBALAMIN 1000 MCG: 1000 INJECTION, SOLUTION INTRAMUSCULAR; SUBCUTANEOUS at 10:57

## 2023-08-24 NOTE — PROGRESS NOTES
Patient here today for her Vitamin B 12 injection   given per nurse protocol    Patient tolerated well   given in Left Deltoid

## 2023-09-08 ENCOUNTER — APPOINTMENT (OUTPATIENT)
Dept: PRIMARY CARE | Facility: CLINIC | Age: 78
End: 2023-09-08
Payer: MEDICARE

## 2023-09-24 DIAGNOSIS — E11.41 DIABETIC MONONEUROPATHY ASSOCIATED WITH TYPE 2 DIABETES MELLITUS (MULTI): Chronic | ICD-10-CM

## 2023-09-24 DIAGNOSIS — E66.01 CLASS 3 SEVERE OBESITY DUE TO EXCESS CALORIES WITH SERIOUS COMORBIDITY AND BODY MASS INDEX (BMI) OF 40.0 TO 44.9 IN ADULT (MULTI): ICD-10-CM

## 2023-09-24 DIAGNOSIS — R10.9 UNSPECIFIED ABDOMINAL PAIN: ICD-10-CM

## 2023-09-25 ENCOUNTER — CLINICAL SUPPORT (OUTPATIENT)
Dept: PRIMARY CARE | Facility: CLINIC | Age: 78
End: 2023-09-25
Payer: MEDICARE

## 2023-09-25 DIAGNOSIS — E53.8 VITAMIN B12 DEFICIENCY: ICD-10-CM

## 2023-09-25 PROCEDURE — 96372 THER/PROPH/DIAG INJ SC/IM: CPT | Performed by: NURSE PRACTITIONER

## 2023-09-25 RX ORDER — CYANOCOBALAMIN 1000 UG/ML
1000 INJECTION, SOLUTION INTRAMUSCULAR; SUBCUTANEOUS ONCE
Status: COMPLETED | OUTPATIENT
Start: 2023-09-25 | End: 2023-09-25

## 2023-09-25 RX ADMIN — CYANOCOBALAMIN 1000 MCG: 1000 INJECTION, SOLUTION INTRAMUSCULAR; SUBCUTANEOUS at 10:21

## 2023-09-25 NOTE — PROGRESS NOTES
Here for B12 injection   Problem: Risk for Impaired Skin Integrity  Goal: Tissue integrity - skin and mucous membranes  Structural intactness and normal physiological function of skin and  mucous membranes. Outcome: Ongoing  Patient remains at risk for impaired skin and tissue integrity due to decreased mobility. Frequent skin assessments have been performed throughout this shift. Patient encouraged to reposition self. No new areas of concern have been noted. Will continue to monitor. Problem: Falls - Risk of  Goal: Absence of falls  Outcome: Ongoing  Patient remains free from falls throughout this shift. Patient calls out appropriately. Call light and bedside table are within reach. Bed in lowest position and wheels are locked. Bed alarm on for added safety.

## 2023-09-28 DIAGNOSIS — E66.01 CLASS 3 SEVERE OBESITY DUE TO EXCESS CALORIES WITH SERIOUS COMORBIDITY AND BODY MASS INDEX (BMI) OF 40.0 TO 44.9 IN ADULT (MULTI): Primary | ICD-10-CM

## 2023-09-28 DIAGNOSIS — E11.41 DIABETIC MONONEUROPATHY ASSOCIATED WITH TYPE 2 DIABETES MELLITUS (MULTI): ICD-10-CM

## 2023-09-28 RX ORDER — OMEPRAZOLE 40 MG/1
40 CAPSULE, DELAYED RELEASE ORAL DAILY
Qty: 90 CAPSULE | Refills: 1 | Status: SHIPPED | OUTPATIENT
Start: 2023-09-28 | End: 2024-03-22 | Stop reason: SDUPTHER

## 2023-09-29 ENCOUNTER — TELEPHONE (OUTPATIENT)
Dept: PRIMARY CARE | Facility: CLINIC | Age: 78
End: 2023-09-29

## 2023-09-29 NOTE — TELEPHONE ENCOUNTER
Sharon called in LM that her Ozempic was $200.00 when she went to pick it up plus she states she is only taking the 1mgm.

## 2023-10-09 ENCOUNTER — OFFICE VISIT (OUTPATIENT)
Dept: PRIMARY CARE | Facility: CLINIC | Age: 78
End: 2023-10-09
Payer: MEDICARE

## 2023-10-09 VITALS
WEIGHT: 265.13 LBS | HEART RATE: 61 BPM | DIASTOLIC BLOOD PRESSURE: 80 MMHG | SYSTOLIC BLOOD PRESSURE: 124 MMHG | BODY MASS INDEX: 41.61 KG/M2 | HEIGHT: 67 IN

## 2023-10-09 DIAGNOSIS — E66.01 CLASS 3 SEVERE OBESITY DUE TO EXCESS CALORIES WITH SERIOUS COMORBIDITY AND BODY MASS INDEX (BMI) OF 40.0 TO 44.9 IN ADULT (MULTI): ICD-10-CM

## 2023-10-09 DIAGNOSIS — E55.9 MILD VITAMIN D DEFICIENCY: Primary | ICD-10-CM

## 2023-10-09 DIAGNOSIS — Z23 IMMUNIZATION DUE: ICD-10-CM

## 2023-10-09 DIAGNOSIS — E11.41 DIABETIC MONONEUROPATHY ASSOCIATED WITH TYPE 2 DIABETES MELLITUS (MULTI): ICD-10-CM

## 2023-10-09 PROBLEM — N18.31 STAGE 3A CHRONIC KIDNEY DISEASE (MULTI): Status: RESOLVED | Noted: 2023-02-07 | Resolved: 2023-10-09

## 2023-10-09 PROCEDURE — 90662 IIV NO PRSV INCREASED AG IM: CPT | Performed by: NURSE PRACTITIONER

## 2023-10-09 PROCEDURE — 1158F ADVNC CARE PLAN TLK DOCD: CPT | Performed by: NURSE PRACTITIONER

## 2023-10-09 PROCEDURE — 1160F RVW MEDS BY RX/DR IN RCRD: CPT | Performed by: NURSE PRACTITIONER

## 2023-10-09 PROCEDURE — G0008 ADMIN INFLUENZA VIRUS VAC: HCPCS | Performed by: NURSE PRACTITIONER

## 2023-10-09 PROCEDURE — 1036F TOBACCO NON-USER: CPT | Performed by: NURSE PRACTITIONER

## 2023-10-09 PROCEDURE — 1159F MED LIST DOCD IN RCRD: CPT | Performed by: NURSE PRACTITIONER

## 2023-10-09 PROCEDURE — 99213 OFFICE O/P EST LOW 20 MIN: CPT | Performed by: NURSE PRACTITIONER

## 2023-10-09 RX ORDER — ERGOCALCIFEROL 1.25 MG/1
50000 CAPSULE ORAL
Qty: 12 CAPSULE | Refills: 1 | Status: SHIPPED | OUTPATIENT
Start: 2023-10-09 | End: 2023-11-17 | Stop reason: ALTCHOICE

## 2023-10-09 ASSESSMENT — ENCOUNTER SYMPTOMS
ARTHRALGIAS: 0
FATIGUE: 0
HEADACHES: 0
SHORTNESS OF BREATH: 0
COLOR CHANGE: 0
HEMATOLOGIC/LYMPHATIC NEGATIVE: 1
ABDOMINAL PAIN: 0
DIARRHEA: 0
CONSTIPATION: 0
PSYCHIATRIC NEGATIVE: 1
DYSURIA: 0
CHEST TIGHTNESS: 0
LIGHT-HEADEDNESS: 0
NAUSEA: 0
BLOOD IN STOOL: 0
DIZZINESS: 0
ACTIVITY CHANGE: 1
PALPITATIONS: 0
VOMITING: 0
MYALGIAS: 0

## 2023-10-09 NOTE — PROGRESS NOTES
"Subjective   Patient ID: Gracia Mercer is a 78 y.o. female who presents for Follow-up.    HPI   Gracia returns for follow up after starting on Ozempic.      Is down 11# since starting medication.     Had foot surgery with Dr. Adam-has follow up scheduled.     She has not gotten her lab work-encouraged her to do this, so we can monitor her diabetes after starting her Ozempic.     Review of Systems   Constitutional:  Positive for activity change (wearing post-op shoe-recently had foot surgery). Negative for fatigue.   Respiratory:  Negative for chest tightness and shortness of breath.    Cardiovascular:  Negative for chest pain, palpitations and leg swelling.   Gastrointestinal:  Negative for abdominal pain, blood in stool, constipation, diarrhea, nausea and vomiting.   Genitourinary:  Negative for dysuria.   Musculoskeletal:  Negative for arthralgias and myalgias.        Uses cane   Skin:  Negative for color change.   Neurological:  Negative for dizziness, light-headedness and headaches.   Hematological: Negative.    Psychiatric/Behavioral: Negative.         Objective   /80   Pulse 61   Ht 1.702 m (5' 7\")   Wt 120 kg (265 lb 2 oz)   BMI 41.52 kg/m²     Physical Exam  Vitals and nursing note reviewed.   Constitutional:       Appearance: Normal appearance.   Cardiovascular:      Rate and Rhythm: Normal rate and regular rhythm.      Pulses: Normal pulses.      Heart sounds: Normal heart sounds.   Pulmonary:      Effort: Pulmonary effort is normal.      Breath sounds: Normal breath sounds.   Skin:     General: Skin is warm and dry.   Neurological:      Mental Status: She is alert and oriented to person, place, and time.   Psychiatric:         Mood and Affect: Mood normal.         Behavior: Behavior normal.         Thought Content: Thought content normal.         Judgment: Judgment normal.         Assessment/Plan   Problem List Items Addressed This Visit             ICD-10-CM    Mild vitamin D " deficiency - Primary E55.9     Vitamin D 50,000 units weekly - refilled           Relevant Medications    ergocalciferol (Vitamin D-2) 1.25 MG (40050 UT) capsule    Class 3 severe obesity due to excess calories with serious comorbidity and body mass index (BMI) of 40.0 to 44.9 in adult (CMS/[facility]) E66.01, Z68.41    Diabetic mononeuropathy associated with type 2 diabetes mellitus (CMS/[facility]) E11.41     Other Visit Diagnoses         Codes    Immunization due     Z23    Relevant Orders    Flu vaccine, quadrivalent, high-dose, preservative free, age 65y+ (FLUZONE)              Follow up in 3 months for weight check. WE will call her with results of her labs.   Flu vaccine today.

## 2023-11-02 ENCOUNTER — TELEPHONE (OUTPATIENT)
Dept: PRIMARY CARE | Facility: CLINIC | Age: 78
End: 2023-11-02

## 2023-11-02 NOTE — TELEPHONE ENCOUNTER
Sharon called she is unable to get her Ozempic is it back ordered    She has called several pharmacies with no luck.   She is asking if it is OK to hold off tell it comes in   Or is there something else.

## 2023-11-16 ENCOUNTER — APPOINTMENT (OUTPATIENT)
Dept: PRIMARY CARE | Facility: CLINIC | Age: 78
End: 2023-11-16
Payer: MEDICARE

## 2023-11-16 ENCOUNTER — LAB (OUTPATIENT)
Dept: LAB | Facility: LAB | Age: 78
End: 2023-11-16
Payer: MEDICARE

## 2023-11-16 DIAGNOSIS — E53.8 VITAMIN B12 DEFICIENCY: ICD-10-CM

## 2023-11-16 DIAGNOSIS — E78.5 MILD HYPERLIPIDEMIA: ICD-10-CM

## 2023-11-16 DIAGNOSIS — E55.9 MILD VITAMIN D DEFICIENCY: ICD-10-CM

## 2023-11-16 DIAGNOSIS — E11.41 DIABETIC MONONEUROPATHY ASSOCIATED WITH TYPE 2 DIABETES MELLITUS (MULTI): ICD-10-CM

## 2023-11-16 LAB
25(OH)D3 SERPL-MCNC: 44 NG/ML (ref 30–100)
ALBUMIN SERPL BCP-MCNC: 3.7 G/DL (ref 3.4–5)
ALP SERPL-CCNC: 53 U/L (ref 33–136)
ALT SERPL W P-5'-P-CCNC: 8 U/L (ref 7–45)
ANION GAP SERPL CALC-SCNC: 10 MMOL/L (ref 10–20)
AST SERPL W P-5'-P-CCNC: 15 U/L (ref 9–39)
BASOPHILS # BLD AUTO: 0.05 X10*3/UL (ref 0–0.1)
BASOPHILS NFR BLD AUTO: 0.6 %
BILIRUB SERPL-MCNC: 0.7 MG/DL (ref 0–1.2)
BUN SERPL-MCNC: 16 MG/DL (ref 6–23)
CALCIUM SERPL-MCNC: 9.3 MG/DL (ref 8.6–10.3)
CHLORIDE SERPL-SCNC: 103 MMOL/L (ref 98–107)
CHOLEST SERPL-MCNC: 125 MG/DL (ref 0–199)
CHOLESTEROL/HDL RATIO: 2.3
CO2 SERPL-SCNC: 30 MMOL/L (ref 21–32)
CREAT SERPL-MCNC: 0.94 MG/DL (ref 0.5–1.05)
EOSINOPHIL # BLD AUTO: 0.06 X10*3/UL (ref 0–0.4)
EOSINOPHIL NFR BLD AUTO: 0.8 %
ERYTHROCYTE [DISTWIDTH] IN BLOOD BY AUTOMATED COUNT: 14.6 % (ref 11.5–14.5)
EST. AVERAGE GLUCOSE BLD GHB EST-MCNC: 94 MG/DL
GFR SERPL CREATININE-BSD FRML MDRD: 63 ML/MIN/1.73M*2
GLUCOSE SERPL-MCNC: 78 MG/DL (ref 74–99)
HBA1C MFR BLD: 4.9 %
HCT VFR BLD AUTO: 49.6 % (ref 36–46)
HDLC SERPL-MCNC: 55 MG/DL
HGB BLD-MCNC: 15.7 G/DL (ref 12–16)
IMM GRANULOCYTES # BLD AUTO: 0.06 X10*3/UL (ref 0–0.5)
IMM GRANULOCYTES NFR BLD AUTO: 0.8 % (ref 0–0.9)
LDLC SERPL CALC-MCNC: 59 MG/DL
LYMPHOCYTES # BLD AUTO: 1.34 X10*3/UL (ref 0.8–3)
LYMPHOCYTES NFR BLD AUTO: 17.3 %
MCH RBC QN AUTO: 30.5 PG (ref 26–34)
MCHC RBC AUTO-ENTMCNC: 31.7 G/DL (ref 32–36)
MCV RBC AUTO: 96 FL (ref 80–100)
MONOCYTES # BLD AUTO: 0.5 X10*3/UL (ref 0.05–0.8)
MONOCYTES NFR BLD AUTO: 6.4 %
NEUTROPHILS # BLD AUTO: 5.75 X10*3/UL (ref 1.6–5.5)
NEUTROPHILS NFR BLD AUTO: 74.1 %
NON HDL CHOLESTEROL: 70 MG/DL (ref 0–149)
NRBC BLD-RTO: 0 /100 WBCS (ref 0–0)
PLATELET # BLD AUTO: 217 X10*3/UL (ref 150–450)
POTASSIUM SERPL-SCNC: 4.4 MMOL/L (ref 3.5–5.3)
PROT SERPL-MCNC: 6.8 G/DL (ref 6.4–8.2)
RBC # BLD AUTO: 5.15 X10*6/UL (ref 4–5.2)
SODIUM SERPL-SCNC: 139 MMOL/L (ref 136–145)
TRIGL SERPL-MCNC: 55 MG/DL (ref 0–149)
TSH SERPL-ACNC: 1.14 MIU/L (ref 0.44–3.98)
VIT B12 SERPL-MCNC: 332 PG/ML (ref 211–911)
VLDL: 11 MG/DL (ref 0–40)
WBC # BLD AUTO: 7.8 X10*3/UL (ref 4.4–11.3)

## 2023-11-16 PROCEDURE — 82607 VITAMIN B-12: CPT

## 2023-11-16 PROCEDURE — 36415 COLL VENOUS BLD VENIPUNCTURE: CPT

## 2023-11-16 PROCEDURE — 80061 LIPID PANEL: CPT

## 2023-11-16 PROCEDURE — 80053 COMPREHEN METABOLIC PANEL: CPT

## 2023-11-16 PROCEDURE — 82306 VITAMIN D 25 HYDROXY: CPT

## 2023-11-16 PROCEDURE — 84443 ASSAY THYROID STIM HORMONE: CPT

## 2023-11-16 PROCEDURE — 85025 COMPLETE CBC W/AUTO DIFF WBC: CPT

## 2023-11-16 PROCEDURE — 83036 HEMOGLOBIN GLYCOSYLATED A1C: CPT

## 2023-11-17 ENCOUNTER — OFFICE VISIT (OUTPATIENT)
Dept: PRIMARY CARE | Facility: CLINIC | Age: 78
End: 2023-11-17
Payer: MEDICARE

## 2023-11-17 VITALS
HEIGHT: 67 IN | DIASTOLIC BLOOD PRESSURE: 80 MMHG | SYSTOLIC BLOOD PRESSURE: 128 MMHG | HEART RATE: 67 BPM | WEIGHT: 260.5 LBS | BODY MASS INDEX: 40.89 KG/M2

## 2023-11-17 DIAGNOSIS — E11.41 DIABETIC MONONEUROPATHY ASSOCIATED WITH TYPE 2 DIABETES MELLITUS (MULTI): ICD-10-CM

## 2023-11-17 DIAGNOSIS — E66.01 CLASS 3 SEVERE OBESITY DUE TO EXCESS CALORIES WITH SERIOUS COMORBIDITY AND BODY MASS INDEX (BMI) OF 40.0 TO 44.9 IN ADULT (MULTI): ICD-10-CM

## 2023-11-17 DIAGNOSIS — L30.9 DERMATITIS: ICD-10-CM

## 2023-11-17 DIAGNOSIS — K21.9 GASTROESOPHAGEAL REFLUX DISEASE WITHOUT ESOPHAGITIS: ICD-10-CM

## 2023-11-17 DIAGNOSIS — E78.5 MILD HYPERLIPIDEMIA: ICD-10-CM

## 2023-11-17 DIAGNOSIS — F33.9 DEPRESSION, RECURRENT (CMS-HCC): ICD-10-CM

## 2023-11-17 DIAGNOSIS — M15.9 PRIMARY OSTEOARTHRITIS INVOLVING MULTIPLE JOINTS: ICD-10-CM

## 2023-11-17 DIAGNOSIS — E53.8 VITAMIN B12 DEFICIENCY: Primary | ICD-10-CM

## 2023-11-17 PROBLEM — R74.8 ELEVATED LIVER ENZYMES: Status: RESOLVED | Noted: 2023-02-07 | Resolved: 2023-11-17

## 2023-11-17 PROCEDURE — 1158F ADVNC CARE PLAN TLK DOCD: CPT | Performed by: NURSE PRACTITIONER

## 2023-11-17 PROCEDURE — 1036F TOBACCO NON-USER: CPT | Performed by: NURSE PRACTITIONER

## 2023-11-17 PROCEDURE — 99214 OFFICE O/P EST MOD 30 MIN: CPT | Performed by: NURSE PRACTITIONER

## 2023-11-17 PROCEDURE — 1160F RVW MEDS BY RX/DR IN RCRD: CPT | Performed by: NURSE PRACTITIONER

## 2023-11-17 PROCEDURE — 1159F MED LIST DOCD IN RCRD: CPT | Performed by: NURSE PRACTITIONER

## 2023-11-17 RX ORDER — CYANOCOBALAMIN 1000 UG/ML
1000 INJECTION, SOLUTION INTRAMUSCULAR; SUBCUTANEOUS ONCE
Status: DISCONTINUED | OUTPATIENT
Start: 2023-11-17 | End: 2024-02-22

## 2023-11-17 RX ORDER — HYDROCORTISONE 1 %
CREAM (GRAM) TOPICAL 2 TIMES DAILY
Qty: 35 G | Refills: 0 | Status: SHIPPED | OUTPATIENT
Start: 2023-11-17

## 2023-11-17 RX ORDER — DICLOFENAC SODIUM 10 MG/G
4 GEL TOPICAL 4 TIMES DAILY
Qty: 100 G | Refills: 1 | Status: SHIPPED | OUTPATIENT
Start: 2023-11-17

## 2023-11-17 RX ORDER — ESCITALOPRAM OXALATE 10 MG/1
10 TABLET ORAL DAILY
Qty: 90 TABLET | Refills: 1 | Status: SHIPPED | OUTPATIENT
Start: 2023-11-17 | End: 2024-04-22 | Stop reason: SDUPTHER

## 2023-11-17 ASSESSMENT — ENCOUNTER SYMPTOMS
ABDOMINAL PAIN: 0
FATIGUE: 0
COLOR CHANGE: 0
BLOOD IN STOOL: 0
ARTHRALGIAS: 0
NAUSEA: 0
DYSPHORIC MOOD: 1
MYALGIAS: 0
DYSURIA: 0
DIZZINESS: 0
LIGHT-HEADEDNESS: 0
NERVOUS/ANXIOUS: 1
HEADACHES: 0
DIARRHEA: 0
SHORTNESS OF BREATH: 0
VOMITING: 0
CHEST TIGHTNESS: 0
PALPITATIONS: 0
CONSTIPATION: 0

## 2023-11-17 NOTE — ASSESSMENT & PLAN NOTE
11/16/23: A1c 4.9   [FreeTextEntry1] : Fransisco Aguilar returns for follow up.\par \par Presenting Clinical Hx:\par For nearly two months, he has been experiencing intermittent chest pressure and palps, at rest, during the day, lasting 12-15 minutes.  He denies exertional symptoms.  He denies sob, pnd, orthopnea, edema, or loc.  He was evaluated for palps 3 years ago in [place], [place].  Evaluation was reportedly unremarkable.\par \par Today, he denies exertional cp, sob, pnd, orthopnea, edema, palp, or loc.  \par \par He is active and compliant with meds.\par \par Telemetry 7/2022: APC/PVC, SVT\par CTA 8/2022: mod LAD (0.87), mild RI, min CX (0.95) and min RCA (0.93);  b/l lower lobe ground glass opacities; periportal lymph node 1.3 cm\par TTE 9/2022: nl lv sys fxn; nl feldman fxn; mild AI\par Carotid Duplex 9/2022: min disease b/l\par Cardiac Cath 12/2022: severe LAD disease, mild RCA/CX; s/p successful PCI of LAD using VERÓNICA\par \par Clinical hx  reviewed in detail.  \par \par Recommendations:\par 1. collect records from primary care and prior CV eval\par 2. exercise\par 3. Mediterranean diet \par 4. continue current meds\par 5. blood work\par 6. f/u in 4 months\par

## 2023-11-17 NOTE — PROGRESS NOTES
"Subjective   Patient ID: Gracia Mercer is a 78 y.o. female who presents for Follow-up.    HPI   Gracia returns for weight mgmt follow up and lab review.     DM/Weight loss mgmt: She is down 16# from August while taking the Ozempic. Has not had it for the last 3 weeks d/t shortage. She has tried a couple local pharmacies to see if they have any in stock. We discussed calling [facility] or [facility] here in town. Also discussed checking with her insurance to see if they offer a mail order program and if they have it available.     Depression: would like her medication increased. Feeling a little more down and doesn't want to do anything.     Arthritic pain bilateral knees: she reports she has been getting more pain in her knees recently. Unable to take NSAIDs d/t history of bariatric surgery and Decreased kidney function.     Review of Systems   Constitutional:  Negative for fatigue.   Respiratory:  Negative for chest tightness and shortness of breath.    Cardiovascular:  Negative for chest pain, palpitations and leg swelling.   Gastrointestinal:  Negative for abdominal pain, blood in stool, constipation, diarrhea, nausea and vomiting.   Genitourinary:  Negative for dysuria.   Musculoskeletal:  Negative for arthralgias and myalgias.   Skin:  Negative for color change.   Neurological:  Negative for dizziness, light-headedness and headaches.   Psychiatric/Behavioral:  Positive for dysphoric mood. The patient is nervous/anxious.        Objective   /80   Pulse 67   Ht 1.702 m (5' 7\")   Wt 118 kg (260 lb 8 oz)   BMI 40.80 kg/m²     Physical Exam  Vitals and nursing note reviewed.   Constitutional:       Appearance: Normal appearance.   HENT:      Head: Normocephalic and atraumatic.   Cardiovascular:      Rate and Rhythm: Normal rate and regular rhythm.      Heart sounds: Normal heart sounds.   Pulmonary:      Effort: Pulmonary effort is normal.      Breath sounds: Normal breath sounds. "   Abdominal:      General: Bowel sounds are normal.      Palpations: Abdomen is soft.   Musculoskeletal:         General: Normal range of motion.   Skin:     General: Skin is warm and dry.      Findings: Rash present. Rash is scaling.             Comments: Small patch of dry skin with scabs noted.    Neurological:      General: No focal deficit present.      Mental Status: She is alert and oriented to person, place, and time.   Psychiatric:         Mood and Affect: Mood normal.         Behavior: Behavior normal.         Thought Content: Thought content normal.         Judgment: Judgment normal.         Assessment/Plan   Problem List Items Addressed This Visit             ICD-10-CM    Osteoarthritis M19.90    Relevant Medications    diclofenac sodium (Voltaren) 1 % gel gel    Depression, recurrent (CMS/[facility]) - Primary F33.9    Relevant Medications    escitalopram (Lexapro) 10 mg tablet    Mild hyperlipidemia E78.5     LDL at goal.           GERD (gastroesophageal reflux disease) K21.9     Controlled. Continue with Omeprazole 40 mg daily         Class 3 severe obesity due to excess calories with serious comorbidity and body mass index (BMI) of 40.0 to 44.9 in adult (CMS/[facility]) E66.01, Z68.41    Diabetic mononeuropathy associated with type 2 diabetes mellitus (CMS/[facility]) E11.41     11/16/23: A1c 4.9          Other Visit Diagnoses         Codes    Dermatitis     L30.9    Relevant Medications    hydrocortisone 1 % cream             Follow up in 3 month for weight check/MCW visit in March.

## 2023-11-17 NOTE — ASSESSMENT & PLAN NOTE
11/16/23: Vitamin D level at 44  Will stop high dose vitamin D. Continue with regular supplement calcium/vitamin D supplement

## 2023-12-04 ENCOUNTER — TELEPHONE (OUTPATIENT)
Dept: PRIMARY CARE | Facility: CLINIC | Age: 78
End: 2023-12-04

## 2023-12-04 NOTE — TELEPHONE ENCOUNTER
Sharon called in She has been with out her Ozempic for almost 6 weeks.   She is asking about mounjaro or something else

## 2023-12-11 ENCOUNTER — APPOINTMENT (OUTPATIENT)
Dept: PRIMARY CARE | Facility: CLINIC | Age: 78
End: 2023-12-11
Payer: MEDICARE

## 2023-12-20 ENCOUNTER — CLINICAL SUPPORT (OUTPATIENT)
Dept: PRIMARY CARE | Facility: CLINIC | Age: 78
End: 2023-12-20
Payer: MEDICARE

## 2023-12-20 DIAGNOSIS — E53.8 VITAMIN B12 DEFICIENCY: ICD-10-CM

## 2023-12-20 PROCEDURE — 96372 THER/PROPH/DIAG INJ SC/IM: CPT | Performed by: NURSE PRACTITIONER

## 2023-12-20 RX ORDER — CYANOCOBALAMIN 1000 UG/ML
1000 INJECTION, SOLUTION INTRAMUSCULAR; SUBCUTANEOUS ONCE
Status: COMPLETED | OUTPATIENT
Start: 2023-12-20 | End: 2023-12-20

## 2023-12-20 RX ADMIN — CYANOCOBALAMIN 1000 MCG: 1000 INJECTION, SOLUTION INTRAMUSCULAR; SUBCUTANEOUS at 09:41

## 2024-01-15 ENCOUNTER — APPOINTMENT (OUTPATIENT)
Dept: PRIMARY CARE | Facility: CLINIC | Age: 79
End: 2024-01-15
Payer: COMMERCIAL

## 2024-01-22 ENCOUNTER — CLINICAL SUPPORT (OUTPATIENT)
Dept: PRIMARY CARE | Facility: CLINIC | Age: 79
End: 2024-01-22
Payer: MEDICARE

## 2024-01-22 DIAGNOSIS — E53.8 VITAMIN B12 DEFICIENCY: ICD-10-CM

## 2024-01-22 PROCEDURE — 96372 THER/PROPH/DIAG INJ SC/IM: CPT | Performed by: NURSE PRACTITIONER

## 2024-01-22 RX ORDER — CYANOCOBALAMIN 1000 UG/ML
1000 INJECTION, SOLUTION INTRAMUSCULAR; SUBCUTANEOUS ONCE
Status: COMPLETED | OUTPATIENT
Start: 2024-01-22 | End: 2024-01-22

## 2024-01-22 RX ADMIN — CYANOCOBALAMIN 1000 MCG: 1000 INJECTION, SOLUTION INTRAMUSCULAR; SUBCUTANEOUS at 10:30

## 2024-02-18 DIAGNOSIS — E11.41 DIABETIC MONONEUROPATHY ASSOCIATED WITH TYPE 2 DIABETES MELLITUS (MULTI): ICD-10-CM

## 2024-02-18 DIAGNOSIS — E66.01 CLASS 3 SEVERE OBESITY DUE TO EXCESS CALORIES WITH SERIOUS COMORBIDITY AND BODY MASS INDEX (BMI) OF 40.0 TO 44.9 IN ADULT (MULTI): ICD-10-CM

## 2024-02-20 RX ORDER — SEMAGLUTIDE 2.68 MG/ML
2 INJECTION, SOLUTION SUBCUTANEOUS
Qty: 3 ML | Refills: 2 | Status: SHIPPED | OUTPATIENT
Start: 2024-02-20 | End: 2024-03-22 | Stop reason: SDUPTHER

## 2024-02-22 ENCOUNTER — CLINICAL SUPPORT (OUTPATIENT)
Dept: PRIMARY CARE | Facility: CLINIC | Age: 79
End: 2024-02-22
Payer: MEDICARE

## 2024-02-22 DIAGNOSIS — E53.8 VITAMIN B12 DEFICIENCY: Primary | ICD-10-CM

## 2024-02-22 PROCEDURE — 96372 THER/PROPH/DIAG INJ SC/IM: CPT | Performed by: NURSE PRACTITIONER

## 2024-02-22 RX ORDER — CYANOCOBALAMIN 1000 UG/ML
1000 INJECTION, SOLUTION INTRAMUSCULAR; SUBCUTANEOUS ONCE
Status: COMPLETED | OUTPATIENT
Start: 2024-02-22 | End: 2024-02-22

## 2024-02-22 RX ADMIN — CYANOCOBALAMIN 1000 MCG: 1000 INJECTION, SOLUTION INTRAMUSCULAR; SUBCUTANEOUS at 10:05

## 2024-02-22 NOTE — PROGRESS NOTES
Patient here today for her Vitamin B 12 shot given in her right deltoid   patient tolerated well   Given per nurse protocol

## 2024-03-22 ENCOUNTER — OFFICE VISIT (OUTPATIENT)
Dept: PRIMARY CARE | Facility: CLINIC | Age: 79
End: 2024-03-22
Payer: MEDICARE

## 2024-03-22 VITALS
SYSTOLIC BLOOD PRESSURE: 124 MMHG | BODY MASS INDEX: 38.83 KG/M2 | DIASTOLIC BLOOD PRESSURE: 80 MMHG | HEIGHT: 67 IN | WEIGHT: 247.38 LBS | HEART RATE: 61 BPM

## 2024-03-22 DIAGNOSIS — Z00.00 MEDICARE ANNUAL WELLNESS VISIT, SUBSEQUENT: ICD-10-CM

## 2024-03-22 DIAGNOSIS — F51.02 ADJUSTMENT INSOMNIA: ICD-10-CM

## 2024-03-22 DIAGNOSIS — E66.01 CLASS 2 SEVERE OBESITY DUE TO EXCESS CALORIES WITH SERIOUS COMORBIDITY AND BODY MASS INDEX (BMI) OF 38.0 TO 38.9 IN ADULT (MULTI): ICD-10-CM

## 2024-03-22 DIAGNOSIS — F33.9 DEPRESSION, RECURRENT (CMS-HCC): ICD-10-CM

## 2024-03-22 DIAGNOSIS — E11.41 DIABETIC MONONEUROPATHY ASSOCIATED WITH TYPE 2 DIABETES MELLITUS (MULTI): ICD-10-CM

## 2024-03-22 DIAGNOSIS — Z23 IMMUNIZATION DUE: ICD-10-CM

## 2024-03-22 DIAGNOSIS — L30.4 INTERTRIGO: ICD-10-CM

## 2024-03-22 DIAGNOSIS — R10.9 UNSPECIFIED ABDOMINAL PAIN: ICD-10-CM

## 2024-03-22 DIAGNOSIS — E53.8 VITAMIN B12 DEFICIENCY: Primary | ICD-10-CM

## 2024-03-22 DIAGNOSIS — R12 HEARTBURN: ICD-10-CM

## 2024-03-22 DIAGNOSIS — N18.31 STAGE 3A CHRONIC KIDNEY DISEASE (MULTI): ICD-10-CM

## 2024-03-22 PROBLEM — L84 CORNS AND CALLUS: Status: RESOLVED | Noted: 2023-03-20 | Resolved: 2024-03-22

## 2024-03-22 PROBLEM — E66.812 CLASS 2 SEVERE OBESITY DUE TO EXCESS CALORIES WITH SERIOUS COMORBIDITY AND BODY MASS INDEX (BMI) OF 38.0 TO 38.9 IN ADULT: Status: ACTIVE | Noted: 2023-03-20

## 2024-03-22 PROCEDURE — 99214 OFFICE O/P EST MOD 30 MIN: CPT | Performed by: NURSE PRACTITIONER

## 2024-03-22 PROCEDURE — 1036F TOBACCO NON-USER: CPT | Performed by: NURSE PRACTITIONER

## 2024-03-22 PROCEDURE — G0439 PPPS, SUBSEQ VISIT: HCPCS | Performed by: NURSE PRACTITIONER

## 2024-03-22 PROCEDURE — 1158F ADVNC CARE PLAN TLK DOCD: CPT | Performed by: NURSE PRACTITIONER

## 2024-03-22 PROCEDURE — 1160F RVW MEDS BY RX/DR IN RCRD: CPT | Performed by: NURSE PRACTITIONER

## 2024-03-22 PROCEDURE — 1123F ACP DISCUSS/DSCN MKR DOCD: CPT | Performed by: NURSE PRACTITIONER

## 2024-03-22 PROCEDURE — 1159F MED LIST DOCD IN RCRD: CPT | Performed by: NURSE PRACTITIONER

## 2024-03-22 PROCEDURE — 1170F FXNL STATUS ASSESSED: CPT | Performed by: NURSE PRACTITIONER

## 2024-03-22 PROCEDURE — 96372 THER/PROPH/DIAG INJ SC/IM: CPT | Performed by: NURSE PRACTITIONER

## 2024-03-22 RX ORDER — OMEPRAZOLE 40 MG/1
40 CAPSULE, DELAYED RELEASE ORAL DAILY
Qty: 90 CAPSULE | Refills: 1 | Status: SHIPPED | OUTPATIENT
Start: 2024-03-22 | End: 2024-04-22 | Stop reason: CLARIF

## 2024-03-22 RX ORDER — CYANOCOBALAMIN 1000 UG/ML
1000 INJECTION, SOLUTION INTRAMUSCULAR; SUBCUTANEOUS ONCE
Status: COMPLETED | OUTPATIENT
Start: 2024-03-22 | End: 2024-03-22

## 2024-03-22 RX ORDER — OMEPRAZOLE 40 MG/1
40 CAPSULE, DELAYED RELEASE ORAL DAILY
Qty: 90 CAPSULE | Refills: 1 | OUTPATIENT
Start: 2024-03-22

## 2024-03-22 RX ORDER — TRAZODONE HYDROCHLORIDE 150 MG/1
150 TABLET ORAL NIGHTLY
Qty: 90 TABLET | Refills: 1 | Status: SHIPPED | OUTPATIENT
Start: 2024-03-22

## 2024-03-22 RX ORDER — NYSTATIN 100000 [USP'U]/G
POWDER TOPICAL 2 TIMES DAILY
Qty: 15 G | Refills: 1 | Status: SHIPPED | OUTPATIENT
Start: 2024-03-22

## 2024-03-22 RX ORDER — SEMAGLUTIDE 2.68 MG/ML
2 INJECTION, SOLUTION SUBCUTANEOUS
Qty: 3 ML | Refills: 2 | Status: SHIPPED | OUTPATIENT
Start: 2024-03-22 | End: 2024-04-22 | Stop reason: ALTCHOICE

## 2024-03-22 RX ADMIN — CYANOCOBALAMIN 1000 MCG: 1000 INJECTION, SOLUTION INTRAMUSCULAR; SUBCUTANEOUS at 11:03

## 2024-03-22 ASSESSMENT — ACTIVITIES OF DAILY LIVING (ADL)
BATHING: INDEPENDENT
GROCERY_SHOPPING: INDEPENDENT
TAKING_MEDICATION: INDEPENDENT
MANAGING_FINANCES: INDEPENDENT
DRESSING: INDEPENDENT
DOING_HOUSEWORK: INDEPENDENT

## 2024-03-22 ASSESSMENT — ENCOUNTER SYMPTOMS
LOSS OF SENSATION IN FEET: 0
ABDOMINAL PAIN: 0
VOMITING: 0
DYSURIA: 0
OCCASIONAL FEELINGS OF UNSTEADINESS: 1
HEADACHES: 0
DIZZINESS: 0
PSYCHIATRIC NEGATIVE: 1
SHORTNESS OF BREATH: 0
DIARRHEA: 0
MYALGIAS: 0
COLOR CHANGE: 0
ARTHRALGIAS: 0
LIGHT-HEADEDNESS: 0
CHEST TIGHTNESS: 0
DEPRESSION: 1
BLOOD IN STOOL: 0
FATIGUE: 0
NAUSEA: 0
CONSTIPATION: 0
PALPITATIONS: 0

## 2024-03-22 ASSESSMENT — PATIENT HEALTH QUESTIONNAIRE - PHQ9
1. LITTLE INTEREST OR PLEASURE IN DOING THINGS: SEVERAL DAYS
2. FEELING DOWN, DEPRESSED OR HOPELESS: SEVERAL DAYS
10. IF YOU CHECKED OFF ANY PROBLEMS, HOW DIFFICULT HAVE THESE PROBLEMS MADE IT FOR YOU TO DO YOUR WORK, TAKE CARE OF THINGS AT HOME, OR GET ALONG WITH OTHER PEOPLE: NOT DIFFICULT AT ALL
SUM OF ALL RESPONSES TO PHQ9 QUESTIONS 1 AND 2: 2

## 2024-03-22 ASSESSMENT — COLUMBIA-SUICIDE SEVERITY RATING SCALE - C-SSRS
2. HAVE YOU ACTUALLY HAD ANY THOUGHTS OF KILLING YOURSELF?: NO
1. IN THE PAST MONTH, HAVE YOU WISHED YOU WERE DEAD OR WISHED YOU COULD GO TO SLEEP AND NOT WAKE UP?: NO
6. HAVE YOU EVER DONE ANYTHING, STARTED TO DO ANYTHING, OR PREPARED TO DO ANYTHING TO END YOUR LIFE?: NO

## 2024-03-22 NOTE — PROGRESS NOTES
"Subjective   Reason for Visit: Gracia Mercer is an 78 y.o. female here for a Medicare Wellness visit.     Past Medical, Surgical, and Family History reviewed and updated in chart.    Reviewed all medications by prescribing practitioner or clinical pharmacist (such as prescriptions, OTCs, herbal therapies and supplements) and documented in the medical record.    HPI  Gracia returns for [facility] exam.        Refuses health screenings  Immunizations: needs prevnar 20-agreeable    Patient Care Team:  NAYANA Lopez-CNP as PCP - General (Family Medicine)  David Adam DPM as Surgeon (Podiatry)  Rigoberto Loco MD as Consulting Physician (Orthopaedic Surgery)     Review of Systems   Constitutional:  Negative for fatigue.   Respiratory:  Negative for chest tightness and shortness of breath.    Cardiovascular:  Negative for chest pain, palpitations and leg swelling.   Gastrointestinal:  Negative for abdominal pain, blood in stool, constipation, diarrhea, nausea and vomiting.   Genitourinary:  Negative for dysuria.   Musculoskeletal:  Negative for arthralgias and myalgias.   Skin:  Negative for color change.   Neurological:  Negative for dizziness, light-headedness and headaches.   Psychiatric/Behavioral: Negative.         Objective   Vitals:  /80   Pulse 61   Ht 1.702 m (5' 7\")   Wt 112 kg (247 lb 6 oz)   BMI 38.74 kg/m²       Physical Exam  Vitals and nursing note reviewed.   Constitutional:       Appearance: Normal appearance.   HENT:      Head: Normocephalic and atraumatic.   Cardiovascular:      Rate and Rhythm: Normal rate and regular rhythm.      Pulses: Normal pulses.      Heart sounds: Normal heart sounds.   Pulmonary:      Effort: Pulmonary effort is normal.      Breath sounds: Normal breath sounds.   Abdominal:      General: Bowel sounds are normal.      Palpations: Abdomen is soft.   Musculoskeletal:      Cervical back: Normal range of motion.   Skin:     General: Skin is warm and dry. "   Neurological:      General: No focal deficit present.      Mental Status: She is alert and oriented to person, place, and time.   Psychiatric:         Mood and Affect: Mood normal.         Behavior: Behavior normal.         Thought Content: Thought content normal.         Judgment: Judgment normal.         Assessment/Plan   Problem List Items Addressed This Visit       Adjustment insomnia    Relevant Medications    traZODone (Desyrel) 150 mg tablet    Stage 3a chronic kidney disease (CMS/[facility])    Relevant Orders    Comprehensive Metabolic Panel    Depression, recurrent (CMS/[facility])    Current Assessment & Plan     Continue escitalopram 10 mg daily         Vitamin B12 deficiency    Current Assessment & Plan     Gets B12 injections monthly         Relevant Orders    Vitamin B12    Intertrigo    Relevant Medications    nystatin (Mycostatin) 100,000 unit/gram powder    Class 2 severe obesity due to excess calories with serious comorbidity and body mass index (BMI) of 38.0 to 38.9 in adult (CMS/[facility])    Current Assessment & Plan     IS down 18# since October  Currently on ozempic         Relevant Medications    semaglutide (Ozempic) 2 mg/dose (8 mg/3 mL) pen injector    Diabetic mononeuropathy associated with type 2 diabetes mellitus (CMS/[facility])    Current Assessment & Plan     Continue with Ozempic 2 mg weekly - refilled         Relevant Medications    semaglutide (Ozempic) 2 mg/dose (8 mg/3 mL) pen injector    Other Relevant Orders    Hemoglobin A1C    Heartburn    Relevant Medications    omeprazole (PriLOSEC) 40 mg DR capsule     Other Visit Diagnoses       Medicare annual wellness visit, subsequent    -  Primary    Immunization due                  Follow up in 6 months for chronic care with labs in May. We will contact her with those labs.       **left without getting pneumonia vaccine-will get it when she comes in for her next B12 injection.

## 2024-04-10 ENCOUNTER — PATIENT OUTREACH (OUTPATIENT)
Dept: CARE COORDINATION | Facility: CLINIC | Age: 79
End: 2024-04-10
Payer: COMMERCIAL

## 2024-04-10 DIAGNOSIS — R78.81 GRAM-NEGATIVE BACTEREMIA: ICD-10-CM

## 2024-04-10 RX ORDER — CIPROFLOXACIN 500 MG/1
500 TABLET ORAL 2 TIMES DAILY
COMMUNITY
Start: 2024-04-09 | End: 2024-04-23

## 2024-04-10 RX ORDER — PANTOPRAZOLE SODIUM 40 MG/1
40 TABLET, DELAYED RELEASE ORAL
COMMUNITY
Start: 2024-04-10 | End: 2024-04-22 | Stop reason: SDUPTHER

## 2024-04-10 NOTE — PROGRESS NOTES
Discharge Facility:[facility]  Discharge Diagnosis:R78.81 Gram negative, bacteremia  Admission Date:4/4/24  Discharge Date: 4/9/24    PCP Appointment Date:4/22/24  Specialist Appointment Date: N/A  Hospital Encounter and Summary: Linked   See discharge assessment below for further details    Medications  Medications reviewed with patient/caregiver?: Yes (4/10/2024  9:18 AM)  Is the patient having any side effects they believe may be caused by any medication additions or changes?: No (4/10/2024  9:18 AM)  Does the patient have all medications ordered at discharge?: Yes (4/10/2024  9:18 AM)  Is the patient taking all medications as directed (includes completed medication regime)?: Yes (4/10/2024  9:18 AM)  Care Management Interventions: Provided patient education (4/10/2024  9:18 AM)  Medication Comments: Cipro, Pantoprazole (4/10/2024  9:18 AM)    Appointments  Does the patient have a primary care provider?: Yes (4/10/2024  9:18 AM)  Care Management Interventions: Verified appointment date/time/provider (4/10/2024  9:18 AM)  Has the patient kept scheduled appointments due by today?: Yes (4/10/2024  9:18 AM)  Care Management Interventions: Advised patient to keep appointment; Educated on importance of keeping appointment (4/10/2024  9:18 AM)    Self Management  Has home health visited the patient within 72 hours of discharge?: Not applicable (4/10/2024  9:18 AM)    Patient Teaching  Does the patient have access to their discharge instructions?: Yes (4/10/2024  9:18 AM)  Care Management Interventions: Reviewed instructions with patient (4/10/2024  9:18 AM)  What is the patient's perception of their health status since discharge?: Improving (4/10/2024  9:18 AM)  Is the patient/caregiver able to teach back the hierarchy of who to call/visit for symptoms/problems? PCP, Specialist, Home Health nurse, Urgent Care, ED, 911: Yes (4/10/2024  9:18 AM)

## 2024-04-22 ENCOUNTER — OFFICE VISIT (OUTPATIENT)
Dept: PRIMARY CARE | Facility: CLINIC | Age: 79
End: 2024-04-22
Payer: MEDICARE

## 2024-04-22 VITALS
HEART RATE: 60 BPM | BODY MASS INDEX: 38.45 KG/M2 | DIASTOLIC BLOOD PRESSURE: 70 MMHG | HEIGHT: 67 IN | SYSTOLIC BLOOD PRESSURE: 104 MMHG | WEIGHT: 245 LBS

## 2024-04-22 DIAGNOSIS — F33.9 DEPRESSION, RECURRENT (CMS-HCC): ICD-10-CM

## 2024-04-22 DIAGNOSIS — E66.01 CLASS 2 SEVERE OBESITY DUE TO EXCESS CALORIES WITH SERIOUS COMORBIDITY AND BODY MASS INDEX (BMI) OF 38.0 TO 38.9 IN ADULT (MULTI): ICD-10-CM

## 2024-04-22 DIAGNOSIS — Z09 HOSPITAL DISCHARGE FOLLOW-UP: Primary | ICD-10-CM

## 2024-04-22 DIAGNOSIS — D69.6 LOW PLATELET COUNT (CMS-HCC): ICD-10-CM

## 2024-04-22 DIAGNOSIS — K21.9 GASTROESOPHAGEAL REFLUX DISEASE WITHOUT ESOPHAGITIS: ICD-10-CM

## 2024-04-22 DIAGNOSIS — E53.8 VITAMIN B12 DEFICIENCY: ICD-10-CM

## 2024-04-22 PROCEDURE — 1123F ACP DISCUSS/DSCN MKR DOCD: CPT | Performed by: NURSE PRACTITIONER

## 2024-04-22 PROCEDURE — 1159F MED LIST DOCD IN RCRD: CPT | Performed by: NURSE PRACTITIONER

## 2024-04-22 PROCEDURE — 96372 THER/PROPH/DIAG INJ SC/IM: CPT | Performed by: NURSE PRACTITIONER

## 2024-04-22 PROCEDURE — 99495 TRANSJ CARE MGMT MOD F2F 14D: CPT | Performed by: NURSE PRACTITIONER

## 2024-04-22 PROCEDURE — 1160F RVW MEDS BY RX/DR IN RCRD: CPT | Performed by: NURSE PRACTITIONER

## 2024-04-22 RX ORDER — PANTOPRAZOLE SODIUM 40 MG/1
40 TABLET, DELAYED RELEASE ORAL
Qty: 90 TABLET | Refills: 1 | Status: SHIPPED | OUTPATIENT
Start: 2024-04-22

## 2024-04-22 RX ORDER — CYANOCOBALAMIN 1000 UG/ML
1000 INJECTION, SOLUTION INTRAMUSCULAR; SUBCUTANEOUS ONCE
Status: COMPLETED | OUTPATIENT
Start: 2024-04-22 | End: 2024-04-22

## 2024-04-22 RX ORDER — ESCITALOPRAM OXALATE 10 MG/1
10 TABLET ORAL DAILY
Qty: 90 TABLET | Refills: 1 | Status: SHIPPED | OUTPATIENT
Start: 2024-04-22

## 2024-04-22 RX ADMIN — CYANOCOBALAMIN 1000 MCG: 1000 INJECTION, SOLUTION INTRAMUSCULAR; SUBCUTANEOUS at 11:01

## 2024-04-22 ASSESSMENT — ENCOUNTER SYMPTOMS
CONSTIPATION: 1
LIGHT-HEADEDNESS: 0
MYALGIAS: 0
BLOOD IN STOOL: 0
HEADACHES: 0
COLOR CHANGE: 0
NAUSEA: 0
DYSURIA: 0
VOMITING: 0
ABDOMINAL PAIN: 0
NERVOUS/ANXIOUS: 0
SHORTNESS OF BREATH: 0
CHEST TIGHTNESS: 0
PALPITATIONS: 0
DIZZINESS: 0
SLEEP DISTURBANCE: 0
FATIGUE: 1
ARTHRALGIAS: 0
DYSPHORIC MOOD: 1
DIARRHEA: 0

## 2024-04-22 NOTE — PROGRESS NOTES
"Patient: Gracia Mercer  : 1945  PCP: Zeinab Chopra, APRN-CNP  MRN: [id number]  Program: No programs to display       Gracia Mercer is a 79 y.o. female presenting today with her daughter, Candy for follow-up after being discharged from the hospital 13 days ago. The main problem requiring admission was ecolibacteremia, DM2, osteoarthritis. The discharge summary and/or Transitional Care Management documentation was reviewed. Medication reconciliation was performed as indicated via the \"Lev as Reviewed\" timestamp.     Gracia Mercer was contacted by Transitional Care Management services two days after her discharge. This encounter and supporting documentation was reviewed.    Review of Systems   Constitutional:  Positive for fatigue.   Respiratory:  Negative for chest tightness and shortness of breath.    Cardiovascular:  Negative for chest pain, palpitations and leg swelling.   Gastrointestinal:  Positive for constipation. Negative for abdominal pain, blood in stool, diarrhea, nausea and vomiting.   Genitourinary:  Negative for dysuria.   Musculoskeletal:  Negative for arthralgias and myalgias.   Skin:  Negative for color change.   Neurological:  Negative for dizziness, light-headedness and headaches.   Psychiatric/Behavioral:  Positive for dysphoric mood. Negative for self-injury, sleep disturbance and suicidal ideas. The patient is not nervous/anxious.        /70   Pulse 60   Ht 1.702 m (5' 7\")   Wt 111 kg (245 lb)   BMI 38.37 kg/m²     Physical Exam  Vitals and nursing note reviewed.   Constitutional:       Appearance: Normal appearance.   HENT:      Head: Normocephalic and atraumatic.   Cardiovascular:      Rate and Rhythm: Normal rate and regular rhythm.      Pulses: Normal pulses.      Heart sounds: Normal heart sounds.   Pulmonary:      Effort: Pulmonary effort is normal.      Breath sounds: Normal breath sounds.   Abdominal:      General: Bowel sounds are normal.      " Palpations: Abdomen is soft.   Musculoskeletal:         General: Normal range of motion.      Cervical back: Normal range of motion.   Skin:     General: Skin is warm and dry.   Neurological:      General: No focal deficit present.      Mental Status: She is alert and oriented to person, place, and time.   Psychiatric:         Mood and Affect: Mood normal.         Behavior: Behavior normal.         Thought Content: Thought content normal.         Judgment: Judgment normal.         The complexity of medical decision making for this patient's transitional care is moderate.    Assessment/Plan   Problem List Items Addressed This Visit             ICD-10-CM    Depression, recurrent (CMS-[facility]) F33.9     Restart lexapro 10 mg daily         Relevant Medications    escitalopram (Lexapro) 10 mg tablet    Vitamin B12 deficiency E53.8    GERD (gastroesophageal reflux disease) K21.9    Relevant Medications    pantoprazole (ProtoNix) 40 mg EC tablet    Class 2 severe obesity due to excess calories with serious comorbidity and body mass index (BMI) of 38.0 to 38.9 in adult (Multi) E66.01, Z68.38    Type 2 diabetes mellitus with diabetic mononeuropathy, without long-term current use of insulin (Multi) E11.41     Stable  A1c ordered  4/25/24: Last A1c 4.8%  Diet controlled         Low platelet count (CMS-HCC) D69.6     Repeat cbc  May need referral to hematology         Relevant Orders    CBC and Auto Differential (Completed)     Other Visit Diagnoses         Codes    Hospital discharge follow-up    -  Primary Z09    Relevant Orders    Comprehensive Metabolic Panel (Completed)           Follow up as scheduled in September.

## 2024-04-24 ENCOUNTER — PATIENT OUTREACH (OUTPATIENT)
Dept: CARE COORDINATION | Facility: CLINIC | Age: 79
End: 2024-04-24
Payer: COMMERCIAL

## 2024-04-24 NOTE — PROGRESS NOTES
Call regarding appt. with PCP on 4/22/24 after hospitalization.  At time of outreach call the patient feels as if their condition has improved since last visit.  Reviewed the PCP appointment with the pt and addressed any questions or concerns.

## 2024-04-25 ENCOUNTER — LAB (OUTPATIENT)
Dept: LAB | Facility: LAB | Age: 79
End: 2024-04-25
Payer: MEDICARE

## 2024-04-25 DIAGNOSIS — E53.8 VITAMIN B12 DEFICIENCY: Primary | ICD-10-CM

## 2024-04-25 DIAGNOSIS — D69.6 LOW PLATELET COUNT (CMS-HCC): ICD-10-CM

## 2024-04-25 DIAGNOSIS — Z09 HOSPITAL DISCHARGE FOLLOW-UP: ICD-10-CM

## 2024-04-25 DIAGNOSIS — E53.8 VITAMIN B12 DEFICIENCY: ICD-10-CM

## 2024-04-25 DIAGNOSIS — E11.41 DIABETIC MONONEUROPATHY ASSOCIATED WITH TYPE 2 DIABETES MELLITUS (MULTI): ICD-10-CM

## 2024-04-25 DIAGNOSIS — D50.8 IRON DEFICIENCY ANEMIA SECONDARY TO INADEQUATE DIETARY IRON INTAKE: ICD-10-CM

## 2024-04-25 LAB
ALBUMIN SERPL BCP-MCNC: 3.6 G/DL (ref 3.4–5)
ALP SERPL-CCNC: 47 U/L (ref 33–136)
ALT SERPL W P-5'-P-CCNC: 8 U/L (ref 7–45)
ANION GAP SERPL CALC-SCNC: 11 MMOL/L (ref 10–20)
AST SERPL W P-5'-P-CCNC: 16 U/L (ref 9–39)
BASOPHILS # BLD AUTO: 0.06 X10*3/UL (ref 0–0.1)
BASOPHILS NFR BLD AUTO: 1.2 %
BILIRUB SERPL-MCNC: 0.6 MG/DL (ref 0–1.2)
BUN SERPL-MCNC: 14 MG/DL (ref 6–23)
CALCIUM SERPL-MCNC: 9.5 MG/DL (ref 8.6–10.3)
CHLORIDE SERPL-SCNC: 103 MMOL/L (ref 98–107)
CO2 SERPL-SCNC: 29 MMOL/L (ref 21–32)
CREAT SERPL-MCNC: 0.93 MG/DL (ref 0.5–1.05)
EGFRCR SERPLBLD CKD-EPI 2021: 64 ML/MIN/1.73M*2
EOSINOPHIL # BLD AUTO: 0.08 X10*3/UL (ref 0–0.4)
EOSINOPHIL NFR BLD AUTO: 1.6 %
ERYTHROCYTE [DISTWIDTH] IN BLOOD BY AUTOMATED COUNT: 15.1 % (ref 11.5–14.5)
EST. AVERAGE GLUCOSE BLD GHB EST-MCNC: 91 MG/DL
GLUCOSE SERPL-MCNC: 70 MG/DL (ref 74–99)
HBA1C MFR BLD: 4.8 %
HCT VFR BLD AUTO: 49.4 % (ref 36–46)
HGB BLD-MCNC: 15.1 G/DL (ref 12–16)
IMM GRANULOCYTES # BLD AUTO: 0.05 X10*3/UL (ref 0–0.5)
IMM GRANULOCYTES NFR BLD AUTO: 1 % (ref 0–0.9)
LYMPHOCYTES # BLD AUTO: 1.29 X10*3/UL (ref 0.8–3)
LYMPHOCYTES NFR BLD AUTO: 25.4 %
MCH RBC QN AUTO: 29.8 PG (ref 26–34)
MCHC RBC AUTO-ENTMCNC: 30.6 G/DL (ref 32–36)
MCV RBC AUTO: 97 FL (ref 80–100)
MONOCYTES # BLD AUTO: 0.58 X10*3/UL (ref 0.05–0.8)
MONOCYTES NFR BLD AUTO: 11.4 %
NEUTROPHILS # BLD AUTO: 3.02 X10*3/UL (ref 1.6–5.5)
NEUTROPHILS NFR BLD AUTO: 59.4 %
NRBC BLD-RTO: 0 /100 WBCS (ref 0–0)
PLATELET # BLD AUTO: 228 X10*3/UL (ref 150–450)
POTASSIUM SERPL-SCNC: 4.3 MMOL/L (ref 3.5–5.3)
PROT SERPL-MCNC: 6.7 G/DL (ref 6.4–8.2)
RBC # BLD AUTO: 5.07 X10*6/UL (ref 4–5.2)
SODIUM SERPL-SCNC: 139 MMOL/L (ref 136–145)
VIT B12 SERPL-MCNC: 1179 PG/ML (ref 211–911)
WBC # BLD AUTO: 5.1 X10*3/UL (ref 4.4–11.3)

## 2024-04-25 PROCEDURE — 83036 HEMOGLOBIN GLYCOSYLATED A1C: CPT

## 2024-04-25 PROCEDURE — 80053 COMPREHEN METABOLIC PANEL: CPT

## 2024-04-25 PROCEDURE — 82607 VITAMIN B-12: CPT

## 2024-04-25 PROCEDURE — 36415 COLL VENOUS BLD VENIPUNCTURE: CPT

## 2024-04-25 PROCEDURE — 85025 COMPLETE CBC W/AUTO DIFF WBC: CPT

## 2024-04-26 NOTE — RESULT ENCOUNTER NOTE
Let her know her B12 level is above normal. We will stop the B12 injections. Her fasting glucose is 70. Her A1c is 4.8%.  Her electrolyte/liver/kidney panel looks okay. Her blood counts look back to her normal range. So she will need to definitely stop the Ozempic due to her low blood sugar.

## 2024-05-06 DIAGNOSIS — L03.113 CELLULITIS OF RIGHT UPPER EXTREMITY: Primary | ICD-10-CM

## 2024-05-06 RX ORDER — CEPHALEXIN 500 MG/1
500 CAPSULE ORAL 3 TIMES DAILY
Qty: 21 CAPSULE | Refills: 0 | Status: SHIPPED | OUTPATIENT
Start: 2024-05-06 | End: 2024-05-13

## 2024-05-09 ENCOUNTER — OFFICE VISIT (OUTPATIENT)
Dept: URGENT CARE | Facility: CLINIC | Age: 79
End: 2024-05-09
Payer: MEDICARE

## 2024-05-09 VITALS
OXYGEN SATURATION: 98 % | HEIGHT: 67 IN | TEMPERATURE: 97.8 F | HEART RATE: 60 BPM | DIASTOLIC BLOOD PRESSURE: 76 MMHG | RESPIRATION RATE: 17 BRPM | BODY MASS INDEX: 38.41 KG/M2 | WEIGHT: 244.71 LBS | SYSTOLIC BLOOD PRESSURE: 113 MMHG

## 2024-05-09 DIAGNOSIS — H66.90 ACUTE OTITIS MEDIA, UNSPECIFIED OTITIS MEDIA TYPE: Primary | ICD-10-CM

## 2024-05-09 PROCEDURE — 99212 OFFICE O/P EST SF 10 MIN: CPT

## 2024-05-09 PROCEDURE — 99213 OFFICE O/P EST LOW 20 MIN: CPT

## 2024-05-09 RX ORDER — AMOXICILLIN AND CLAVULANATE POTASSIUM 875; 125 MG/1; MG/1
1 TABLET, FILM COATED ORAL 2 TIMES DAILY
Qty: 14 TABLET | Refills: 0 | Status: SHIPPED | OUTPATIENT
Start: 2024-05-09 | End: 2024-05-16

## 2024-05-09 NOTE — PROGRESS NOTES
[facility] URGENT CARE JOSÉ MIGUEL NOTE:      Name: Gracia Mercer, 79 y.o.    CSN:[id number]   MRN:[id number]    PCP: NAYANA Lopez-CNP    ALL:    Allergies   Allergen Reactions    Nsaids (Non-Steroidal Anti-Inflammatory Drug) Unknown       History:    Chief Complaint: Earache (Loss of hearing for 2 days.)    Encounter Date: 5/9/2024      HPI: The history was obtained from the patient. Gracia is a 79 y.o. female, who presents with a chief complaint of Earache (Loss of hearing for 2 days.).  She states she has been having bilateral otalgia for 2 days.  States she does have muffled hearing.  She does states she has noticed some drainage from her ears.  States she was recently put on Keflex on this past Monday for a prophylactic skin infection.  She denies fevers, HA, nausea, vomiting, dizziness, chest pain, shortness of breath.    PMHx:    Past Medical History:   Diagnosis Date    Abdominal pain 02/07/2023    Adjustment disorder with mixed anxiety and depressed mood 10/16/2019    Acute adjustment disorder with mixed anxiety and depressed mood    Corns and callus 03/20/2023    Diabetic mononeuropathy associated with type 2 diabetes mellitus (Multi) 03/20/2023    Elevated liver enzymes 02/07/2023    Personal history of other diseases of the respiratory system 10/16/2019    History of acute bronchitis    Personal history of other endocrine, nutritional and metabolic disease 09/23/2022    History of diabetes mellitus              Current Outpatient Medications   Medication Sig Dispense Refill    acetaminophen (Tylenol 8 Hour) 650 mg ER tablet Take 1 tablet (650 mg) by mouth every 8 hours if needed for moderate pain (4 - 6).      albuterol 90 mcg/actuation inhaler Inhale 2 puffs every 6 hours if needed for wheezing.      amoxicillin-pot clavulanate (Augmentin) 875-125 mg tablet Take 1 tablet by mouth 2 times a day for 7 days. 14 tablet 0    blood pressure monitor (Blood Pressure Kit) kit 1 each  once daily. Check BP daily and as needed. 1 kit 0    blood sugar diagnostic (Blood Glucose Test) strip 1 strip once daily. 100 strip 0    cephalexin (Keflex) 500 mg capsule Take 1 capsule (500 mg) by mouth 3 times a day for 7 days. 21 capsule 0    cyanocobalamin (Vitamin B-12) 1,000 mcg/mL injection Inject 1 mL (1,000 mcg) into the muscle every 30 (thirty) days.      diclofenac sodium (Voltaren) 1 % gel gel Apply 1 Application topically 4 times a day. 100 g 1    escitalopram (Lexapro) 10 mg tablet Take 1 tablet (10 mg) by mouth once daily. 90 tablet 1    FreeStyle glucose monitoring (Blood Glucose Monitoring) kit 1 each once daily. 1 each 0    hydrocortisone 1 % cream Apply topically 2 times a day. 35 g 0    lancets misc Inject 1 each under the skin once daily. 100 each 0    montelukast (Singulair) 10 mg tablet Take 1 tablet (10 mg) by mouth once daily at bedtime.      nystatin (Mycostatin) 100,000 unit/gram powder Apply topically 2 times a day. 15 g 1    OneTouch Delica Plus Lancet 33 gauge misc INJECT 1 EACH UNDER THE SKIN ONCE DAILY.      pantoprazole (ProtoNix) 40 mg EC tablet Take 1 tablet (40 mg) by mouth once daily. 90 tablet 1    traZODone (Desyrel) 150 mg tablet Take 1 tablet (150 mg) by mouth once daily at bedtime. 90 tablet 1     No current facility-administered medications for this visit.         PMSx:    Past Surgical History:   Procedure Laterality Date    FOOT SURGERY      OTHER SURGICAL HISTORY  10/16/2019    Gastric bypass surgery    OTHER SURGICAL HISTORY  10/16/2019     section    OTHER SURGICAL HISTORY  10/16/2019    Cholecystectomy laparoscopic    OTHER SURGICAL HISTORY  10/16/2019    Carpal tunnel surgery    OTHER SURGICAL HISTORY  10/16/2019    Appendectomy    OTHER SURGICAL HISTORY  10/16/2019    Knee replacement    OTHER SURGICAL HISTORY  2022    Knee replacement       Fam Hx:   Family History   Problem Relation Name Age of Onset    Hypertension Mother      Other (malignant  neoplasm) Mother      Diabetes type II Mother      Other (CVA) Father      Hypertension Father      Diabetes type II Father      Hypertension Child         SOC. Hx:     Social History     Socioeconomic History    Marital status:      Spouse name: Not on file    Number of children: Not on file    Years of education: Not on file    Highest education level: Not on file   Occupational History    Not on file   Tobacco Use    Smoking status: Never    Smokeless tobacco: Never   Vaping Use    Vaping status: Never Used   Substance and Sexual Activity    Alcohol use: Never    Drug use: Never    Sexual activity: Not on file   Other Topics Concern    Not on file   Social History Narrative    Not on file     Social Determinants of Health     Financial Resource Strain: Not on file   Food Insecurity: Not on file   Transportation Needs: Not on file   Physical Activity: Not on file   Stress: Not on file   Social Connections: Not on file   Intimate Partner Violence: Not on file   Housing Stability: Not on file         Vitals:    05/09/24 1141   BP: 113/76   Pulse: 60   Resp: 17   Temp: 36.6 °C (97.8 °F)   SpO2: 98%     111 kg (244 lb 11.4 oz)          Physical Exam  Vitals reviewed.   Constitutional:       Appearance: Normal appearance.   HENT:      Right Ear: Tympanic membrane is erythematous and bulging.      Left Ear: Tympanic membrane is erythematous and bulging.      Ears:      Comments: There does appear to be blood in bilateral ear canals.  I do not visualize a perforated tympanic membrane bilaterally.     Nose: Nose normal.      Mouth/Throat:      Mouth: Mucous membranes are moist.      Pharynx: Oropharynx is clear.   Eyes:      Conjunctiva/sclera: Conjunctivae normal.      Pupils: Pupils are equal, round, and reactive to light.   Cardiovascular:      Rate and Rhythm: Normal rate and regular rhythm.      Pulses: Normal pulses.      Heart sounds: Normal heart sounds.   Pulmonary:      Effort: Pulmonary effort is normal.       Breath sounds: Normal breath sounds.   Skin:     General: Skin is warm.   Neurological:      General: No focal deficit present.      Mental Status: She is alert and oriented to person, place, and time.   Psychiatric:         Mood and Affect: Mood normal.         Behavior: Behavior normal.       I did personally review Gracia's past medical history, surgical history, social history, as well as family history (when relevant).  In this case, I also oversaw the her drug management by reviewing her medication list, allergy list, as well as the medications that I prescribed during the UC course and/or recommended as an out-patient (including possible OTC medications such as acetaminophen, NSAIDs , etc).    After reviewing the items above, I did look at previous medical documentation, such as recent hospitalizations, office visits, and/or recent consultations with PCP/specialist.                          SDOH:   Another factor that I considered in Gracia's care was her Social Determinants of Health (SDOH). During this UC encounter, she did not have social determinants of health. Those SDOH influencing Gracia's care are: none    LABORATORY @ RADIOLOGICAL IMAGING (if done):     No results found for this or any previous visit (from the past 24 hour(s)).    UC COURSE/MEDICAL DECISION MAKING:    Gracia is a 79 y.o., who presents with a working diagnosis of   1. Acute otitis media, unspecified otitis media type      Gracia was seen today for earache.  Diagnoses and all orders for this visit:  Acute otitis media, unspecified otitis media type (Primary)  -     amoxicillin-pot clavulanate (Augmentin) 875-125 mg tablet; Take 1 tablet by mouth 2 times a day for 7 days.  -     Referral to ENT; Future    Based on my clinical evaluation Gracia has acute otitis media. At this time there is no evidence of tympanic membrane perforation or bacterial sinus infection.    I instructed her to take the antibiotic as directed.  She is to  "discontinue Keflex at this time.  Discussed with her that Augmentin will cover both ear infection as well as skin infection.  As we discussed she is to return to our office or ER immediately if there is any worsening of her symptoms, such as fever, HA, nausea/vomiting, jaw pain or if her condition worsens at all.      Will send in referral to ENT as she likely perforated her tympanic membranes as there was blood visualized in bilateral ear canals as well as patient reporting drainage from the ears.    Laurie El PA-C   Advanced Practice Provider  [facility] URG[facility]    Please note: Portions of this chart may have been created with Dragon voice recognition software. Occasional wrong-word or \"sound-like\" substitutions may have occurred due to inherent limitations of the voice recognition software. Please excuse any typographical or grammatical errors contained herein. Please read the chart carefully and recognize, using context, where the substitutions have occurred.   "

## 2024-05-24 ENCOUNTER — PATIENT OUTREACH (OUTPATIENT)
Dept: CARE COORDINATION | Facility: CLINIC | Age: 79
End: 2024-05-24
Payer: COMMERCIAL

## 2024-06-21 ENCOUNTER — PATIENT OUTREACH (OUTPATIENT)
Dept: CARE COORDINATION | Facility: CLINIC | Age: 79
End: 2024-06-21
Payer: COMMERCIAL

## 2024-06-21 NOTE — PROGRESS NOTES
Successful 90 day outreach to patient regarding hospitalization as patient continues TCM program.   At time of outreach call the patient feels as if their condition has improved since initial visit with PCP or specialist. Questions or concerns addressed at this time with patient.

## 2024-06-26 ENCOUNTER — TELEPHONE (OUTPATIENT)
Dept: PRIMARY CARE | Facility: CLINIC | Age: 79
End: 2024-06-26

## 2024-07-30 ENCOUNTER — APPOINTMENT (OUTPATIENT)
Dept: PRIMARY CARE | Facility: CLINIC | Age: 79
End: 2024-07-30
Payer: MEDICARE

## 2024-08-12 ENCOUNTER — APPOINTMENT (OUTPATIENT)
Dept: PRIMARY CARE | Facility: CLINIC | Age: 79
End: 2024-08-12
Payer: MEDICARE

## 2024-08-12 VITALS
DIASTOLIC BLOOD PRESSURE: 70 MMHG | BODY MASS INDEX: 40.02 KG/M2 | HEIGHT: 67 IN | SYSTOLIC BLOOD PRESSURE: 110 MMHG | WEIGHT: 255 LBS | HEART RATE: 54 BPM

## 2024-08-12 DIAGNOSIS — E66.01 CLASS 2 SEVERE OBESITY DUE TO EXCESS CALORIES WITH SERIOUS COMORBIDITY AND BODY MASS INDEX (BMI) OF 39.0 TO 39.9 IN ADULT (MULTI): ICD-10-CM

## 2024-08-12 DIAGNOSIS — E53.8 VITAMIN B12 DEFICIENCY: ICD-10-CM

## 2024-08-12 DIAGNOSIS — E11.41 TYPE 2 DIABETES MELLITUS WITH DIABETIC MONONEUROPATHY, WITHOUT LONG-TERM CURRENT USE OF INSULIN (MULTI): ICD-10-CM

## 2024-08-12 DIAGNOSIS — D69.6 LOW PLATELET COUNT (CMS-HCC): ICD-10-CM

## 2024-08-12 DIAGNOSIS — D50.8 IRON DEFICIENCY ANEMIA SECONDARY TO INADEQUATE DIETARY IRON INTAKE: ICD-10-CM

## 2024-08-12 DIAGNOSIS — R63.5 WEIGHT GAIN: Primary | ICD-10-CM

## 2024-08-12 PROCEDURE — 1160F RVW MEDS BY RX/DR IN RCRD: CPT | Performed by: NURSE PRACTITIONER

## 2024-08-12 PROCEDURE — 1123F ACP DISCUSS/DSCN MKR DOCD: CPT | Performed by: NURSE PRACTITIONER

## 2024-08-12 PROCEDURE — 99214 OFFICE O/P EST MOD 30 MIN: CPT | Performed by: NURSE PRACTITIONER

## 2024-08-12 PROCEDURE — 1159F MED LIST DOCD IN RCRD: CPT | Performed by: NURSE PRACTITIONER

## 2024-08-12 ASSESSMENT — ENCOUNTER SYMPTOMS
BLOOD IN STOOL: 0
HEADACHES: 0
ARTHRALGIAS: 1
MYALGIAS: 0
DIZZINESS: 0
CHEST TIGHTNESS: 0
DIARRHEA: 0
FATIGUE: 0
CONSTIPATION: 0
COLOR CHANGE: 0
LIGHT-HEADEDNESS: 0
DYSURIA: 0
PALPITATIONS: 0
SHORTNESS OF BREATH: 0
ABDOMINAL PAIN: 0
VOMITING: 0
NAUSEA: 0

## 2024-08-12 NOTE — PROGRESS NOTES
"Subjective   Patient ID: Gracia Mercer is a 79 y.o. female who presents for Follow-up.    HPI   Gracia returns for requesting something to help her lose weight.    We had her on ozempic due to her previous history of diabetes, however she was continuing to be inactive and her A1c had dropped along with her glucose level. We had to take her off of it. She has gained 11 pounds since May. She has continued to be inactive and drinks a \"16.9 oz regular soda\" a day. She reports she got an exercise bike and is going to start using it. Encouraged her to decrease her caloric intake, stop drinking sugary drinks especially pop and that she needs to get regular exercise.       Review of Systems   Constitutional:  Negative for fatigue.   Respiratory:  Negative for chest tightness and shortness of breath.    Cardiovascular:  Negative for chest pain, palpitations and leg swelling.   Gastrointestinal:  Negative for abdominal pain, blood in stool, constipation, diarrhea, nausea and vomiting.   Genitourinary:  Negative for dysuria.   Musculoskeletal:  Positive for arthralgias (knee pain). Negative for myalgias.   Skin:  Negative for color change.   Neurological:  Negative for dizziness, light-headedness and headaches.       Objective   /70   Pulse 54   Ht 1.702 m (5' 7\")   Wt 116 kg (255 lb)   BMI 39.94 kg/m²     Physical Exam  Vitals and nursing note reviewed.   Constitutional:       Appearance: Normal appearance.   Cardiovascular:      Rate and Rhythm: Normal rate and regular rhythm.   Pulmonary:      Effort: Pulmonary effort is normal.   Neurological:      General: No focal deficit present.      Mental Status: She is alert and oriented to person, place, and time.   Psychiatric:         Mood and Affect: Mood normal.         Behavior: Behavior normal.         Thought Content: Thought content normal.         Judgment: Judgment normal.         Assessment/Plan   Problem List Items Addressed This Visit             " ICD-10-CM    Vitamin B12 deficiency E53.8     Gets B12 injections monthly         Relevant Orders    Vitamin B12    Class 2 severe obesity due to excess calories with serious comorbidity and body mass index (BMI) of 39.0 to 39.9 in adult (Multi) E66.01, Z68.39    Type 2 diabetes mellitus with diabetic mononeuropathy, without long-term current use of insulin (Multi) E11.41     Stable  A1c ordered  4/25/24: Last A1c 4.8%  Diet controlled         Relevant Orders    Comprehensive Metabolic Panel    TSH with reflex to Free T4 if abnormal    Hemoglobin A1C    Low platelet count (CMS-HCC) D69.6     Improved with last blood count  Will recheck cbc           Relevant Orders    CBC and Auto Differential    Iron deficiency anemia secondary to inadequate dietary iron intake D50.8    Relevant Orders    Iron and TIBC    Ferritin    Folate    Reticulocytes     Other Visit Diagnoses         Codes    Weight gain    -  Primary R63.5              Follow up in September as scheduled. Lab work prior to appointment.

## 2024-09-10 DIAGNOSIS — F33.9 DEPRESSION, RECURRENT (CMS-HCC): ICD-10-CM

## 2024-09-20 ENCOUNTER — APPOINTMENT (OUTPATIENT)
Dept: PRIMARY CARE | Facility: CLINIC | Age: 79
End: 2024-09-20
Payer: MEDICARE

## 2024-09-20 VITALS
WEIGHT: 253 LBS | BODY MASS INDEX: 39.71 KG/M2 | HEART RATE: 57 BPM | SYSTOLIC BLOOD PRESSURE: 120 MMHG | DIASTOLIC BLOOD PRESSURE: 70 MMHG | HEIGHT: 67 IN

## 2024-09-20 DIAGNOSIS — E66.01 CLASS 2 SEVERE OBESITY DUE TO EXCESS CALORIES WITH SERIOUS COMORBIDITY AND BODY MASS INDEX (BMI) OF 39.0 TO 39.9 IN ADULT: ICD-10-CM

## 2024-09-20 DIAGNOSIS — F33.9 DEPRESSION, RECURRENT (CMS-HCC): Primary | ICD-10-CM

## 2024-09-20 DIAGNOSIS — J45.20 MILD INTERMITTENT ASTHMA WITHOUT COMPLICATION (HHS-HCC): ICD-10-CM

## 2024-09-20 DIAGNOSIS — L30.4 INTERTRIGO: ICD-10-CM

## 2024-09-20 PROCEDURE — 99214 OFFICE O/P EST MOD 30 MIN: CPT | Performed by: NURSE PRACTITIONER

## 2024-09-20 PROCEDURE — 1123F ACP DISCUSS/DSCN MKR DOCD: CPT | Performed by: NURSE PRACTITIONER

## 2024-09-20 PROCEDURE — 1160F RVW MEDS BY RX/DR IN RCRD: CPT | Performed by: NURSE PRACTITIONER

## 2024-09-20 PROCEDURE — 1159F MED LIST DOCD IN RCRD: CPT | Performed by: NURSE PRACTITIONER

## 2024-09-20 RX ORDER — NYSTATIN 100000 [USP'U]/G
POWDER TOPICAL 2 TIMES DAILY
Qty: 15 G | Refills: 1 | Status: SHIPPED | OUTPATIENT
Start: 2024-09-20

## 2024-09-20 RX ORDER — MONTELUKAST SODIUM 10 MG/1
10 TABLET ORAL NIGHTLY
Qty: 90 TABLET | Refills: 1 | Status: SHIPPED | OUTPATIENT
Start: 2024-09-20

## 2024-09-20 RX ORDER — ESCITALOPRAM OXALATE 10 MG/1
10 TABLET ORAL DAILY
Qty: 90 TABLET | Refills: 1 | OUTPATIENT
Start: 2024-09-20

## 2024-09-20 RX ORDER — SEMAGLUTIDE 0.68 MG/ML
INJECTION, SOLUTION SUBCUTANEOUS
COMMUNITY
Start: 2024-09-18

## 2024-09-20 RX ORDER — ESCITALOPRAM OXALATE 20 MG/1
20 TABLET ORAL DAILY
Qty: 90 TABLET | Refills: 1 | Status: SHIPPED | OUTPATIENT
Start: 2024-09-20

## 2024-09-20 ASSESSMENT — ENCOUNTER SYMPTOMS
BLOOD IN STOOL: 0
LIGHT-HEADEDNESS: 0
ARTHRALGIAS: 0
CHEST TIGHTNESS: 0
DYSURIA: 0
DIZZINESS: 0
MYALGIAS: 0
PALPITATIONS: 0
DIARRHEA: 0
HEADACHES: 0
COLOR CHANGE: 0
NAUSEA: 0
FATIGUE: 0
ABDOMINAL PAIN: 0
CONSTIPATION: 0
SHORTNESS OF BREATH: 0
VOMITING: 0
PSYCHIATRIC NEGATIVE: 1

## 2024-09-20 NOTE — PROGRESS NOTES
"Subjective   Patient ID: Gracia Mercer is a 79 y.o. female who presents for Follow-up.    HPI   Gracia returns with her daughter for follow up.    Asthma: doing well on Singulair. Albuterol as needed.     Intregio: using Nystatin powder.      She is getting simaglutide from \"[facility]\". We discussed that she needs to monitor her blood sugar as this was a problem for her when she was on it before. We discussed that she needs to be active or the medication is not going to work. I explained that I was not approving of her being on this since she was having dizzy spells and frequent falls when she was on it in the past due to her blood sugars dropping. Her last A1c was 4.8 in April.     Review of Systems   Constitutional:  Negative for fatigue.   HENT: Negative.     Respiratory:  Negative for chest tightness and shortness of breath.    Cardiovascular:  Negative for chest pain, palpitations and leg swelling.   Gastrointestinal:  Negative for abdominal pain, blood in stool, constipation, diarrhea, nausea and vomiting.   Genitourinary:  Negative for dysuria.   Musculoskeletal:  Negative for arthralgias and myalgias.   Skin:  Negative for color change.   Neurological:  Negative for dizziness, light-headedness and headaches.   Psychiatric/Behavioral: Negative.         Objective   /70   Pulse 57   Ht 1.702 m (5' 7\")   Wt 115 kg (253 lb)   BMI 39.63 kg/m²     Physical Exam  Vitals and nursing note reviewed.   Constitutional:       Appearance: Normal appearance.   HENT:      Head: Normocephalic and atraumatic.   Cardiovascular:      Rate and Rhythm: Normal rate and regular rhythm.      Pulses: Normal pulses.      Heart sounds: Normal heart sounds.   Pulmonary:      Effort: Pulmonary effort is normal.      Breath sounds: Normal breath sounds.   Abdominal:      General: Bowel sounds are normal.      Palpations: Abdomen is soft.   Musculoskeletal:         General: Normal range of motion.      " Cervical back: Normal range of motion.   Skin:     General: Skin is warm and dry.      Findings: Rash (abdmonial folds-intermittently) present.   Neurological:      General: No focal deficit present.      Mental Status: She is alert and oriented to person, place, and time.   Psychiatric:         Mood and Affect: Mood normal.         Behavior: Behavior normal.         Thought Content: Thought content normal.         Judgment: Judgment normal.         Assessment/Plan   Problem List Items Addressed This Visit             ICD-10-CM    Asthma ([facility]) J45.909     Currently on albuterol -controlled  Montelukast 10 mg daily - controlled         Relevant Medications    montelukast (Singulair) 10 mg tablet    Depression, recurrent ([facility]) - Primary F33.9     Escitalopram 20 mg daily - refilled         Relevant Medications    escitalopram (Lexapro) 20 mg tablet    Intertrigo L30.4     Nystatin refilled         Relevant Medications    nystatin (Mycostatin) 100,000 unit/gram powder    Class 2 severe obesity due to excess calories with serious comorbidity and body mass index (BMI) of 39.0 to 39.9 in adult (Multi) E66.01, Z68.39          Follow up in march for mcw exam. Needs lab work. Reminded her to get this done.     For any new medications that were prescribed today, the patient was educated about their indications for use, administration, frequency and potential side effects of the medication.

## 2024-10-15 DIAGNOSIS — K21.9 GASTROESOPHAGEAL REFLUX DISEASE WITHOUT ESOPHAGITIS: ICD-10-CM

## 2024-10-15 RX ORDER — PANTOPRAZOLE SODIUM 40 MG/1
40 TABLET, DELAYED RELEASE ORAL
Qty: 90 TABLET | Refills: 1 | Status: SHIPPED | OUTPATIENT
Start: 2024-10-15

## 2024-11-12 NOTE — ADDENDUM NOTE
Addended by: NIMISHA HEALY on: 11/17/2023 10:28 AM     Modules accepted: Orders     Left a message to call back.

## 2025-02-19 DIAGNOSIS — K21.9 GASTROESOPHAGEAL REFLUX DISEASE WITHOUT ESOPHAGITIS: ICD-10-CM

## 2025-02-28 LAB
ALBUMIN SERPL-MCNC: 3.8 G/DL (ref 3.6–5.1)
ALP SERPL-CCNC: 55 U/L (ref 37–153)
ALT SERPL-CCNC: 6 U/L (ref 6–29)
ANION GAP SERPL CALCULATED.4IONS-SCNC: 8 MMOL/L (CALC) (ref 7–17)
AST SERPL-CCNC: 14 U/L (ref 10–35)
BASOPHILS # BLD AUTO: 50 CELLS/UL (ref 0–200)
BASOPHILS NFR BLD AUTO: 0.7 %
BILIRUB SERPL-MCNC: 0.5 MG/DL (ref 0.2–1.2)
BUN SERPL-MCNC: 15 MG/DL (ref 7–25)
CALCIUM SERPL-MCNC: 9.2 MG/DL (ref 8.6–10.4)
CHLORIDE SERPL-SCNC: 102 MMOL/L (ref 98–110)
CO2 SERPL-SCNC: 31 MMOL/L (ref 20–32)
CREAT SERPL-MCNC: 1 MG/DL (ref 0.6–1)
EGFRCR SERPLBLD CKD-EPI 2021: 58 ML/MIN/1.73M2
EOSINOPHIL # BLD AUTO: 57 CELLS/UL (ref 15–500)
EOSINOPHIL NFR BLD AUTO: 0.8 %
ERYTHROCYTE [DISTWIDTH] IN BLOOD BY AUTOMATED COUNT: 13.2 % (ref 11–15)
EST. AVERAGE GLUCOSE BLD GHB EST-MCNC: 108 MG/DL
EST. AVERAGE GLUCOSE BLD GHB EST-SCNC: 6 MMOL/L
FERRITIN SERPL-MCNC: 11 NG/ML (ref 16–288)
FOLATE SERPL-MCNC: 16.7 NG/ML
GLUCOSE SERPL-MCNC: 85 MG/DL (ref 65–99)
HBA1C MFR BLD: 5.4 % OF TOTAL HGB
HCT VFR BLD AUTO: 44.6 % (ref 35–45)
HGB BLD-MCNC: 14.7 G/DL (ref 11.7–15.5)
IRON SATN MFR SERPL: 16 % (CALC) (ref 16–45)
IRON SERPL-MCNC: 72 MCG/DL (ref 45–160)
LYMPHOCYTES # BLD AUTO: 1392 CELLS/UL (ref 850–3900)
LYMPHOCYTES NFR BLD AUTO: 19.6 %
MCH RBC QN AUTO: 29.8 PG (ref 27–33)
MCHC RBC AUTO-ENTMCNC: 33 G/DL (ref 32–36)
MCV RBC AUTO: 90.3 FL (ref 80–100)
MONOCYTES # BLD AUTO: 440 CELLS/UL (ref 200–950)
MONOCYTES NFR BLD AUTO: 6.2 %
NEUTROPHILS # BLD AUTO: 5162 CELLS/UL (ref 1500–7800)
NEUTROPHILS NFR BLD AUTO: 72.7 %
PLATELET # BLD AUTO: 189 THOUSAND/UL (ref 140–400)
PMV BLD REES-ECKER: 11 FL (ref 7.5–12.5)
POTASSIUM SERPL-SCNC: 4.4 MMOL/L (ref 3.5–5.3)
PROT SERPL-MCNC: 6.9 G/DL (ref 6.1–8.1)
RBC # BLD AUTO: 4.94 MILLION/UL (ref 3.8–5.1)
RETICS #: NORMAL CELLS/UL (ref 20000–80000)
RETICS/RBC NFR AUTO: 1.5 %
SODIUM SERPL-SCNC: 141 MMOL/L (ref 135–146)
TIBC SERPL-MCNC: 442 MCG/DL (CALC) (ref 250–450)
TSH SERPL-ACNC: 2.21 MIU/L (ref 0.4–4.5)
VIT B12 SERPL-MCNC: 252 PG/ML (ref 200–1100)
WBC # BLD AUTO: 7.1 THOUSAND/UL (ref 3.8–10.8)

## 2025-03-03 ENCOUNTER — APPOINTMENT (OUTPATIENT)
Dept: PRIMARY CARE | Facility: CLINIC | Age: 80
End: 2025-03-03
Payer: MEDICARE

## 2025-03-03 VITALS
HEIGHT: 67 IN | OXYGEN SATURATION: 97 % | WEIGHT: 260 LBS | BODY MASS INDEX: 40.81 KG/M2 | DIASTOLIC BLOOD PRESSURE: 78 MMHG | SYSTOLIC BLOOD PRESSURE: 122 MMHG | HEART RATE: 47 BPM

## 2025-03-03 DIAGNOSIS — R00.1 BRADYCARDIA: ICD-10-CM

## 2025-03-03 DIAGNOSIS — N18.31 STAGE 3A CHRONIC KIDNEY DISEASE (MULTI): ICD-10-CM

## 2025-03-03 DIAGNOSIS — Z00.00 ROUTINE GENERAL MEDICAL EXAMINATION AT HEALTH CARE FACILITY: Primary | ICD-10-CM

## 2025-03-03 DIAGNOSIS — E66.813 CLASS 3 SEVERE OBESITY DUE TO EXCESS CALORIES WITH SERIOUS COMORBIDITY AND BODY MASS INDEX (BMI) OF 40.0 TO 44.9 IN ADULT: ICD-10-CM

## 2025-03-03 DIAGNOSIS — E66.01 CLASS 3 SEVERE OBESITY DUE TO EXCESS CALORIES WITH SERIOUS COMORBIDITY AND BODY MASS INDEX (BMI) OF 40.0 TO 44.9 IN ADULT: ICD-10-CM

## 2025-03-03 DIAGNOSIS — F33.9 DEPRESSION, RECURRENT (CMS-HCC): ICD-10-CM

## 2025-03-03 DIAGNOSIS — S61.411A SKIN TEAR OF RIGHT HAND WITHOUT COMPLICATION, INITIAL ENCOUNTER: ICD-10-CM

## 2025-03-03 DIAGNOSIS — E11.41 TYPE 2 DIABETES MELLITUS WITH DIABETIC MONONEUROPATHY, WITHOUT LONG-TERM CURRENT USE OF INSULIN: ICD-10-CM

## 2025-03-03 DIAGNOSIS — E78.5 MILD HYPERLIPIDEMIA: ICD-10-CM

## 2025-03-03 DIAGNOSIS — E53.8 VITAMIN B12 DEFICIENCY: ICD-10-CM

## 2025-03-03 PROBLEM — D69.6 LOW PLATELET COUNT (CMS-HCC): Status: RESOLVED | Noted: 2024-04-25 | Resolved: 2025-03-03

## 2025-03-03 PROBLEM — D50.8 IRON DEFICIENCY ANEMIA SECONDARY TO INADEQUATE DIETARY IRON INTAKE: Status: RESOLVED | Noted: 2024-08-12 | Resolved: 2025-03-03

## 2025-03-03 PROCEDURE — 1159F MED LIST DOCD IN RCRD: CPT | Performed by: NURSE PRACTITIONER

## 2025-03-03 PROCEDURE — 1170F FXNL STATUS ASSESSED: CPT | Performed by: NURSE PRACTITIONER

## 2025-03-03 PROCEDURE — 99214 OFFICE O/P EST MOD 30 MIN: CPT | Performed by: NURSE PRACTITIONER

## 2025-03-03 PROCEDURE — 1123F ACP DISCUSS/DSCN MKR DOCD: CPT | Performed by: NURSE PRACTITIONER

## 2025-03-03 PROCEDURE — G0439 PPPS, SUBSEQ VISIT: HCPCS | Performed by: NURSE PRACTITIONER

## 2025-03-03 PROCEDURE — 1160F RVW MEDS BY RX/DR IN RCRD: CPT | Performed by: NURSE PRACTITIONER

## 2025-03-03 PROCEDURE — 1036F TOBACCO NON-USER: CPT | Performed by: NURSE PRACTITIONER

## 2025-03-03 PROCEDURE — G0444 DEPRESSION SCREEN ANNUAL: HCPCS | Performed by: NURSE PRACTITIONER

## 2025-03-03 RX ORDER — CEPHALEXIN 500 MG/1
500 CAPSULE ORAL 4 TIMES DAILY
Qty: 28 CAPSULE | Refills: 0 | Status: SHIPPED | OUTPATIENT
Start: 2025-03-03 | End: 2025-03-10

## 2025-03-03 RX ORDER — TIRZEPATIDE 5 MG/.5ML
5 INJECTION, SOLUTION SUBCUTANEOUS WEEKLY
COMMUNITY
Start: 2025-02-12

## 2025-03-03 ASSESSMENT — ENCOUNTER SYMPTOMS
RESPIRATORY NEGATIVE: 1
LIGHT-HEADEDNESS: 0
PSYCHIATRIC NEGATIVE: 1
OCCASIONAL FEELINGS OF UNSTEADINESS: 0
PALPITATIONS: 0
ARTHRALGIAS: 1
HEADACHES: 0
VOMITING: 0
CONSTIPATION: 0
GASTROINTESTINAL NEGATIVE: 1
DEPRESSION: 0
ABDOMINAL PAIN: 0
BLOOD IN STOOL: 0
DIARRHEA: 0
WOUND: 1
DYSURIA: 0
SHORTNESS OF BREATH: 0
CHEST TIGHTNESS: 0
DIZZINESS: 0
CARDIOVASCULAR NEGATIVE: 1
MYALGIAS: 0
LOSS OF SENSATION IN FEET: 0
NAUSEA: 0
FATIGUE: 0
COLOR CHANGE: 0

## 2025-03-03 ASSESSMENT — ACTIVITIES OF DAILY LIVING (ADL)
MANAGING_FINANCES: INDEPENDENT
GROCERY_SHOPPING: INDEPENDENT
DOING_HOUSEWORK: INDEPENDENT
TAKING_MEDICATION: INDEPENDENT
DRESSING: INDEPENDENT
BATHING: INDEPENDENT

## 2025-03-03 ASSESSMENT — PATIENT HEALTH QUESTIONNAIRE - PHQ9
2. FEELING DOWN, DEPRESSED OR HOPELESS: NOT AT ALL
SUM OF ALL RESPONSES TO PHQ9 QUESTIONS 1 AND 2: 0
1. LITTLE INTEREST OR PLEASURE IN DOING THINGS: NOT AT ALL

## 2025-03-03 NOTE — ASSESSMENT & PLAN NOTE
Stable    4/25/24: Last A1c 4.8%  2/27/25: A1c 5.4%    Went to [facility] and was prescribed Mounjaro by the nurse practitioner there. She is currently on 5 mg weekly.     Orders:    Hemoglobin A1C; Future

## 2025-03-03 NOTE — PROGRESS NOTES
"Subjective   Reason for Visit: Gracia Mercer is an 79 y.o. female here for a Medicare Wellness visit.     Past Medical, Surgical, and Family History reviewed and updated in chart.    Reviewed all medications by prescribing practitioner or clinical pharmacist (such as prescriptions, OTCs, herbal therapies and supplements) and documented in the medical record.    HPI  Gracia returns for [facility] exam.    DM: she is currently on Mounjaro 5 mg from [facility]-her A1c is 5.4%  Bradycardia: stress test in 2021 by  showed her HR 49.   Stage 3a CKD: GFR decreased, increase water intake.     Has a skin tear on right hand: her dog caught her with it's long nails and ripped her skin. Has been using antibiotic cream and band aid to cover.         Patient Care Team:  NAYANA Lopez-CNP as PCP - General (Family Medicine)  David Adam DPM as Surgeon (Podiatry)  Rigoberto Loco MD as Consulting Physician (Orthopaedic Surgery)     Review of Systems   Constitutional:  Negative for fatigue.   HENT: Negative.     Respiratory: Negative.  Negative for chest tightness and shortness of breath.    Cardiovascular: Negative.  Negative for chest pain, palpitations and leg swelling.   Gastrointestinal: Negative.  Negative for abdominal pain, blood in stool, constipation, diarrhea, nausea and vomiting.   Genitourinary: Negative.  Negative for dysuria.   Musculoskeletal:  Positive for arthralgias and gait problem (due to knee pain). Negative for myalgias.   Skin:  Positive for wound (right hand skin tear). Negative for color change.   Neurological:  Negative for dizziness, light-headedness and headaches.   Psychiatric/Behavioral: Negative.         Objective   Vitals:  /78   Pulse (!) 47   Ht 1.702 m (5' 7\")   Wt 118 kg (260 lb)   SpO2 97%   BMI 40.72 kg/m²       Physical Exam  Vitals and nursing note reviewed.   Constitutional:       Appearance: Normal appearance.   HENT:      Head: Normocephalic and " atraumatic.   Cardiovascular:      Rate and Rhythm: Regular rhythm. Bradycardia present.      Pulses: Normal pulses.      Heart sounds: Normal heart sounds.   Pulmonary:      Effort: Pulmonary effort is normal.      Breath sounds: Normal breath sounds.   Abdominal:      General: Bowel sounds are normal.      Palpations: Abdomen is soft.   Skin:     General: Skin is warm and dry.   Neurological:      General: No focal deficit present.      Mental Status: She is alert and oriented to person, place, and time.   Psychiatric:         Mood and Affect: Mood normal.         Behavior: Behavior normal.         Thought Content: Thought content normal.         Judgment: Judgment normal.         Assessment & Plan  Routine general medical examination at health care facility         Type 2 diabetes mellitus with diabetic mononeuropathy, without long-term current use of insulin  Stable    4/25/24: Last A1c 4.8%  2/27/25: A1c 5.4%    Went to [facility] and was prescribed Mounjaro by the nurse practitioner there. She is currently on 5 mg weekly.     Orders:    Hemoglobin A1C; Future    Stage 3a chronic kidney disease (Multi)  2/27/25: GFR decreased to 58, BUN 15, CR 1.00  Increase water intake         Bradycardia  Not symptomatic. Has been bradycardic for years. Back in 2021 on EKG at cardiologist office had 1st degree block per office visit note.       Depression, recurrent (CMS-HCC)  Escitalopram 20 mg daily   Stable  Denies SI or self harm.          Skin tear of right hand without complication, initial encounter    Orders:    cephalexin (Keflex) 500 mg capsule; Take 1 capsule (500 mg) by mouth 4 times a day for 7 days.    Vitamin B12 deficiency    Orders:    Vitamin B12; Future    Mild hyperlipidemia    Orders:    Comprehensive Metabolic Panel; Future    Lipid Panel; Future    Class 3 severe obesity due to excess calories with serious comorbidity and body mass index (BMI) of 40.0 to 44.9 in adult         Depression Screening  5 -  10 minutes were spent screening for depression.     Follow up in 3 months for chronic care.    For any new medications that were prescribed today, the patient was educated about their indications for use, administration, frequency and potential side effects of the medication.

## 2025-03-03 NOTE — ASSESSMENT & PLAN NOTE
Not symptomatic. Has been bradycardic for years. Back in 2021 on EKG at cardiologist office had 1st degree block per office visit note.

## 2025-04-02 DIAGNOSIS — I10 PRIMARY HYPERTENSION: Primary | ICD-10-CM

## 2025-04-02 RX ORDER — ACETAMINOPHEN 500 MG
1 TABLET ORAL DAILY
Qty: 1 EACH | Refills: 0 | Status: SHIPPED | OUTPATIENT
Start: 2025-04-02

## 2025-04-14 RX ORDER — PANTOPRAZOLE SODIUM 40 MG/1
40 TABLET, DELAYED RELEASE ORAL DAILY
Qty: 90 TABLET | Refills: 1 | OUTPATIENT
Start: 2025-04-14

## 2025-05-05 DIAGNOSIS — K21.9 GASTROESOPHAGEAL REFLUX DISEASE WITHOUT ESOPHAGITIS: ICD-10-CM

## 2025-05-05 RX ORDER — PANTOPRAZOLE SODIUM 40 MG/1
40 TABLET, DELAYED RELEASE ORAL
Qty: 90 TABLET | Refills: 1 | Status: SHIPPED | OUTPATIENT
Start: 2025-05-05

## 2025-05-18 DIAGNOSIS — F33.9 DEPRESSION, RECURRENT: ICD-10-CM

## 2025-05-19 RX ORDER — ESCITALOPRAM OXALATE 20 MG/1
20 TABLET ORAL DAILY
Qty: 90 TABLET | Refills: 1 | Status: SHIPPED | OUTPATIENT
Start: 2025-05-19

## 2025-06-09 ENCOUNTER — APPOINTMENT (OUTPATIENT)
Dept: PRIMARY CARE | Facility: CLINIC | Age: 80
End: 2025-06-09
Payer: MEDICARE

## 2025-08-01 DIAGNOSIS — E53.8 VITAMIN B12 DEFICIENCY: ICD-10-CM

## 2025-08-01 DIAGNOSIS — E78.5 MILD HYPERLIPIDEMIA: ICD-10-CM

## 2025-08-01 DIAGNOSIS — E11.41 TYPE 2 DIABETES MELLITUS WITH DIABETIC MONONEUROPATHY, WITHOUT LONG-TERM CURRENT USE OF INSULIN: ICD-10-CM

## 2025-08-08 ENCOUNTER — PATIENT OUTREACH (OUTPATIENT)
Age: 80
End: 2025-08-08
Payer: COMMERCIAL

## 2025-08-08 NOTE — PROGRESS NOTES
Discharge Facility: [facility]  Discharge Diagnosis: Abdominal pain  Admission Date: 8/4/25  Discharge Date:  8/7/25    PCP Appointment Date: [facility] - msg to office  Specialist Appointment Date:   - Infectious disease  - General surgery  Hospital Encounter and Summary Linked: Yes  Hospital Encounter   See discharge assessment below for further details: n/A    Two attempts were made to reach patient within two business days after discharge. Left voicemail with contact information for patient to call back with any non-emergent questions or concerns.

## 2025-08-15 ENCOUNTER — APPOINTMENT (OUTPATIENT)
Dept: PRIMARY CARE | Facility: CLINIC | Age: 80
End: 2025-08-15
Payer: COMMERCIAL

## 2025-08-15 VITALS
BODY MASS INDEX: 39.39 KG/M2 | SYSTOLIC BLOOD PRESSURE: 124 MMHG | DIASTOLIC BLOOD PRESSURE: 70 MMHG | HEART RATE: 55 BPM | WEIGHT: 251 LBS | HEIGHT: 67 IN

## 2025-08-15 DIAGNOSIS — L30.4 INTERTRIGO: ICD-10-CM

## 2025-08-15 DIAGNOSIS — D72.829 LEUKOCYTOSIS, UNSPECIFIED TYPE: ICD-10-CM

## 2025-08-15 DIAGNOSIS — R79.89 ELEVATED LFTS: ICD-10-CM

## 2025-08-15 DIAGNOSIS — E11.41 TYPE 2 DIABETES MELLITUS WITH DIABETIC MONONEUROPATHY, WITHOUT LONG-TERM CURRENT USE OF INSULIN: ICD-10-CM

## 2025-08-15 DIAGNOSIS — F51.02 ADJUSTMENT INSOMNIA: ICD-10-CM

## 2025-08-15 DIAGNOSIS — R78.81 GRAM-NEGATIVE BACTEREMIA: ICD-10-CM

## 2025-08-15 DIAGNOSIS — E66.812 CLASS 2 SEVERE OBESITY DUE TO EXCESS CALORIES WITH SERIOUS COMORBIDITY AND BODY MASS INDEX (BMI) OF 39.0 TO 39.9 IN ADULT: ICD-10-CM

## 2025-08-15 DIAGNOSIS — Z09 HOSPITAL DISCHARGE FOLLOW-UP: Primary | ICD-10-CM

## 2025-08-15 DIAGNOSIS — E78.5 MILD HYPERLIPIDEMIA: ICD-10-CM

## 2025-08-15 DIAGNOSIS — F33.9 DEPRESSION, RECURRENT: ICD-10-CM

## 2025-08-15 DIAGNOSIS — E66.01 CLASS 2 SEVERE OBESITY DUE TO EXCESS CALORIES WITH SERIOUS COMORBIDITY AND BODY MASS INDEX (BMI) OF 39.0 TO 39.9 IN ADULT: ICD-10-CM

## 2025-08-15 DIAGNOSIS — K21.9 GASTROESOPHAGEAL REFLUX DISEASE WITHOUT ESOPHAGITIS: ICD-10-CM

## 2025-08-15 DIAGNOSIS — J45.20 MILD INTERMITTENT ASTHMA WITHOUT COMPLICATION (HHS-HCC): ICD-10-CM

## 2025-08-15 PROBLEM — H40.059 RAISED INTRAOCULAR PRESSURE: Status: RESOLVED | Noted: 2019-05-20 | Resolved: 2025-08-15

## 2025-08-15 PROBLEM — G93.41 METABOLIC ENCEPHALOPATHY: Status: RESOLVED | Noted: 2025-08-04 | Resolved: 2025-08-15

## 2025-08-15 PROBLEM — H43.819 POSTERIOR VITREOUS DETACHMENT: Status: RESOLVED | Noted: 2018-06-01 | Resolved: 2025-08-15

## 2025-08-15 PROBLEM — H40.059 OCULAR HYPERTENSION: Status: RESOLVED | Noted: 2018-06-01 | Resolved: 2025-08-15

## 2025-08-15 PROBLEM — L60.9: Status: RESOLVED | Noted: 2025-08-13 | Resolved: 2025-08-15

## 2025-08-15 PROBLEM — I99.9 CIRCULATION PROBLEM: Status: RESOLVED | Noted: 2025-08-13 | Resolved: 2025-08-15

## 2025-08-15 PROCEDURE — 1111F DSCHRG MED/CURRENT MED MERGE: CPT | Performed by: NURSE PRACTITIONER

## 2025-08-15 PROCEDURE — 1159F MED LIST DOCD IN RCRD: CPT | Performed by: NURSE PRACTITIONER

## 2025-08-15 PROCEDURE — 99495 TRANSJ CARE MGMT MOD F2F 14D: CPT | Performed by: NURSE PRACTITIONER

## 2025-08-15 PROCEDURE — 1160F RVW MEDS BY RX/DR IN RCRD: CPT | Performed by: NURSE PRACTITIONER

## 2025-08-15 RX ORDER — ESCITALOPRAM OXALATE 20 MG/1
20 TABLET ORAL DAILY
Qty: 90 TABLET | Refills: 1 | Status: SHIPPED | OUTPATIENT
Start: 2025-08-15

## 2025-08-15 RX ORDER — TRAZODONE HYDROCHLORIDE 50 MG/1
50 TABLET ORAL NIGHTLY
Qty: 90 TABLET | Refills: 1 | Status: SHIPPED | OUTPATIENT
Start: 2025-08-15

## 2025-08-15 RX ORDER — MONTELUKAST SODIUM 10 MG/1
10 TABLET ORAL NIGHTLY
Qty: 90 TABLET | Refills: 1 | Status: SHIPPED | OUTPATIENT
Start: 2025-08-15

## 2025-08-15 RX ORDER — NYSTATIN 100000 [USP'U]/G
POWDER TOPICAL 2 TIMES DAILY
Qty: 15 G | Refills: 1 | Status: SHIPPED | OUTPATIENT
Start: 2025-08-15

## 2025-08-15 RX ORDER — PANTOPRAZOLE SODIUM 40 MG/1
40 TABLET, DELAYED RELEASE ORAL
Qty: 90 TABLET | Refills: 1 | Status: SHIPPED | OUTPATIENT
Start: 2025-08-15

## 2025-08-20 ENCOUNTER — PATIENT OUTREACH (OUTPATIENT)
Age: 80
End: 2025-08-20
Payer: COMMERCIAL

## 2025-08-21 ASSESSMENT — ENCOUNTER SYMPTOMS
SHORTNESS OF BREATH: 0
LIGHT-HEADEDNESS: 0
COLOR CHANGE: 0
BLOOD IN STOOL: 0
VOMITING: 0
PSYCHIATRIC NEGATIVE: 1
CHEST TIGHTNESS: 0
ABDOMINAL PAIN: 0
DYSURIA: 0
DIARRHEA: 0
FATIGUE: 1
HEADACHES: 0
PALPITATIONS: 0
CONSTIPATION: 0
NAUSEA: 0
MYALGIAS: 0
DIZZINESS: 0
ARTHRALGIAS: 1

## 2025-11-17 ENCOUNTER — APPOINTMENT (OUTPATIENT)
Dept: PRIMARY CARE | Facility: CLINIC | Age: 80
End: 2025-11-17
Payer: COMMERCIAL